# Patient Record
Sex: FEMALE | Race: WHITE | NOT HISPANIC OR LATINO | URBAN - METROPOLITAN AREA
[De-identification: names, ages, dates, MRNs, and addresses within clinical notes are randomized per-mention and may not be internally consistent; named-entity substitution may affect disease eponyms.]

---

## 2021-04-06 ENCOUNTER — INPATIENT (INPATIENT)
Facility: HOSPITAL | Age: 76
LOS: 2 days | Discharge: HOME HEALTH | DRG: 659 | End: 2021-04-09
Attending: STUDENT IN AN ORGANIZED HEALTH CARE EDUCATION/TRAINING PROGRAM
Payer: MEDICARE

## 2021-04-06 VITALS
WEIGHT: 119.93 LBS | DIASTOLIC BLOOD PRESSURE: 84 MMHG | HEART RATE: 104 BPM | OXYGEN SATURATION: 94 % | SYSTOLIC BLOOD PRESSURE: 134 MMHG | TEMPERATURE: 99 F | RESPIRATION RATE: 26 BRPM

## 2021-04-06 DIAGNOSIS — E03.9 HYPOTHYROIDISM, UNSPECIFIED: ICD-10-CM

## 2021-04-06 DIAGNOSIS — K59.00 CONSTIPATION, UNSPECIFIED: ICD-10-CM

## 2021-04-06 DIAGNOSIS — S72.009A FRACTURE OF UNSPECIFIED PART OF NECK OF UNSPECIFIED FEMUR, INITIAL ENCOUNTER FOR CLOSED FRACTURE: ICD-10-CM

## 2021-04-06 DIAGNOSIS — R63.8 OTHER SYMPTOMS AND SIGNS CONCERNING FOOD AND FLUID INTAKE: ICD-10-CM

## 2021-04-06 DIAGNOSIS — F41.9 ANXIETY DISORDER, UNSPECIFIED: ICD-10-CM

## 2021-04-06 DIAGNOSIS — I26.99 OTHER PULMONARY EMBOLISM WITHOUT ACUTE COR PULMONALE: ICD-10-CM

## 2021-04-06 DIAGNOSIS — J96.90 RESPIRATORY FAILURE, UNSPECIFIED, UNSPECIFIED WHETHER WITH HYPOXIA OR HYPERCAPNIA: ICD-10-CM

## 2021-04-06 DIAGNOSIS — A41.9 SEPSIS, UNSPECIFIED ORGANISM: ICD-10-CM

## 2021-04-06 DIAGNOSIS — G62.9 POLYNEUROPATHY, UNSPECIFIED: ICD-10-CM

## 2021-04-06 DIAGNOSIS — E83.52 HYPERCALCEMIA: ICD-10-CM

## 2021-04-06 LAB
ALBUMIN SERPL ELPH-MCNC: 3.6 G/DL — SIGNIFICANT CHANGE UP (ref 3.3–5)
ALBUMIN SERPL ELPH-MCNC: 4.1 G/DL — SIGNIFICANT CHANGE UP (ref 3.3–5)
ALP SERPL-CCNC: 49 U/L — SIGNIFICANT CHANGE UP (ref 40–120)
ALP SERPL-CCNC: 60 U/L — SIGNIFICANT CHANGE UP (ref 40–120)
ALT FLD-CCNC: 12 U/L — SIGNIFICANT CHANGE UP (ref 10–45)
ALT FLD-CCNC: 15 U/L — SIGNIFICANT CHANGE UP (ref 10–45)
ANION GAP SERPL CALC-SCNC: 10 MMOL/L — SIGNIFICANT CHANGE UP (ref 5–17)
ANION GAP SERPL CALC-SCNC: 12 MMOL/L — SIGNIFICANT CHANGE UP (ref 5–17)
APPEARANCE UR: CLEAR — SIGNIFICANT CHANGE UP
APTT BLD: 38 SEC — HIGH (ref 27.5–35.5)
AST SERPL-CCNC: 14 U/L — SIGNIFICANT CHANGE UP (ref 10–40)
AST SERPL-CCNC: 17 U/L — SIGNIFICANT CHANGE UP (ref 10–40)
BACTERIA # UR AUTO: ABNORMAL /HPF
BASE EXCESS BLDV CALC-SCNC: 4 MMOL/L — SIGNIFICANT CHANGE UP
BASOPHILS # BLD AUTO: 0.03 K/UL — SIGNIFICANT CHANGE UP (ref 0–0.2)
BASOPHILS # BLD AUTO: 0.06 K/UL — SIGNIFICANT CHANGE UP (ref 0–0.2)
BASOPHILS NFR BLD AUTO: 0.3 % — SIGNIFICANT CHANGE UP (ref 0–2)
BASOPHILS NFR BLD AUTO: 0.4 % — SIGNIFICANT CHANGE UP (ref 0–2)
BILIRUB SERPL-MCNC: 0.8 MG/DL — SIGNIFICANT CHANGE UP (ref 0.2–1.2)
BILIRUB SERPL-MCNC: 1.1 MG/DL — SIGNIFICANT CHANGE UP (ref 0.2–1.2)
BILIRUB UR-MCNC: ABNORMAL
BUN SERPL-MCNC: 11 MG/DL — SIGNIFICANT CHANGE UP (ref 7–23)
BUN SERPL-MCNC: 11 MG/DL — SIGNIFICANT CHANGE UP (ref 7–23)
CA-I SERPL-SCNC: 1.38 MMOL/L — HIGH (ref 1.12–1.3)
CALCIUM SERPL-MCNC: 10.4 MG/DL — SIGNIFICANT CHANGE UP (ref 8.4–10.5)
CALCIUM SERPL-MCNC: 11.2 MG/DL — HIGH (ref 8.4–10.5)
CHLORIDE SERPL-SCNC: 104 MMOL/L — SIGNIFICANT CHANGE UP (ref 96–108)
CHLORIDE SERPL-SCNC: 96 MMOL/L — SIGNIFICANT CHANGE UP (ref 96–108)
CO2 SERPL-SCNC: 24 MMOL/L — SIGNIFICANT CHANGE UP (ref 22–31)
CO2 SERPL-SCNC: 27 MMOL/L — SIGNIFICANT CHANGE UP (ref 22–31)
COLOR SPEC: YELLOW — SIGNIFICANT CHANGE UP
COMMENT - URINE: SIGNIFICANT CHANGE UP
CREAT SERPL-MCNC: 0.4 MG/DL — LOW (ref 0.5–1.3)
CREAT SERPL-MCNC: 0.47 MG/DL — LOW (ref 0.5–1.3)
CRP SERPL-MCNC: 69.6 MG/L — HIGH (ref 0–4)
D DIMER BLD IA.RAPID-MCNC: 4007 NG/ML DDU — HIGH
DIFF PNL FLD: ABNORMAL
EOSINOPHIL # BLD AUTO: 0 K/UL — SIGNIFICANT CHANGE UP (ref 0–0.5)
EOSINOPHIL # BLD AUTO: 0.23 K/UL — SIGNIFICANT CHANGE UP (ref 0–0.5)
EOSINOPHIL NFR BLD AUTO: 0 % — SIGNIFICANT CHANGE UP (ref 0–6)
EOSINOPHIL NFR BLD AUTO: 1.5 % — SIGNIFICANT CHANGE UP (ref 0–6)
EPI CELLS # UR: SIGNIFICANT CHANGE UP /HPF (ref 0–5)
FERRITIN SERPL-MCNC: 428 NG/ML — HIGH (ref 15–150)
GAS PNL BLDV: 136 MMOL/L — LOW (ref 138–146)
GAS PNL BLDV: SIGNIFICANT CHANGE UP
GAS PNL BLDV: SIGNIFICANT CHANGE UP
GLUCOSE SERPL-MCNC: 187 MG/DL — HIGH (ref 70–99)
GLUCOSE SERPL-MCNC: 209 MG/DL — HIGH (ref 70–99)
GLUCOSE UR QL: NEGATIVE — SIGNIFICANT CHANGE UP
HCO3 BLDV-SCNC: 28 MMOL/L — HIGH (ref 20–27)
HCT VFR BLD CALC: 35.8 % — SIGNIFICANT CHANGE UP (ref 34.5–45)
HCT VFR BLD CALC: 39.8 % — SIGNIFICANT CHANGE UP (ref 34.5–45)
HGB BLD-MCNC: 11.6 G/DL — SIGNIFICANT CHANGE UP (ref 11.5–15.5)
HGB BLD-MCNC: 13.3 G/DL — SIGNIFICANT CHANGE UP (ref 11.5–15.5)
IMM GRANULOCYTES NFR BLD AUTO: 0.6 % — SIGNIFICANT CHANGE UP (ref 0–1.5)
IMM GRANULOCYTES NFR BLD AUTO: 1.7 % — HIGH (ref 0–1.5)
INR BLD: 1.34 — HIGH (ref 0.88–1.16)
KETONES UR-MCNC: NEGATIVE — SIGNIFICANT CHANGE UP
LACTATE SERPL-SCNC: 2.7 MMOL/L — HIGH (ref 0.5–2)
LACTATE SERPL-SCNC: 2.8 MMOL/L — HIGH (ref 0.5–2)
LACTATE SERPL-SCNC: 3 MMOL/L — HIGH (ref 0.5–2)
LACTATE SERPL-SCNC: 3.4 MMOL/L — HIGH (ref 0.5–2)
LEUKOCYTE ESTERASE UR-ACNC: ABNORMAL
LYMPHOCYTES # BLD AUTO: 0.68 K/UL — LOW (ref 1–3.3)
LYMPHOCYTES # BLD AUTO: 1.99 K/UL — SIGNIFICANT CHANGE UP (ref 1–3.3)
LYMPHOCYTES # BLD AUTO: 13 % — SIGNIFICANT CHANGE UP (ref 13–44)
LYMPHOCYTES # BLD AUTO: 5.7 % — LOW (ref 13–44)
MCHC RBC-ENTMCNC: 29.5 PG — SIGNIFICANT CHANGE UP (ref 27–34)
MCHC RBC-ENTMCNC: 30 PG — SIGNIFICANT CHANGE UP (ref 27–34)
MCHC RBC-ENTMCNC: 32.4 GM/DL — SIGNIFICANT CHANGE UP (ref 32–36)
MCHC RBC-ENTMCNC: 33.4 GM/DL — SIGNIFICANT CHANGE UP (ref 32–36)
MCV RBC AUTO: 89.8 FL — SIGNIFICANT CHANGE UP (ref 80–100)
MCV RBC AUTO: 91.1 FL — SIGNIFICANT CHANGE UP (ref 80–100)
MONOCYTES # BLD AUTO: 0.18 K/UL — SIGNIFICANT CHANGE UP (ref 0–0.9)
MONOCYTES # BLD AUTO: 1.35 K/UL — HIGH (ref 0–0.9)
MONOCYTES NFR BLD AUTO: 1.5 % — LOW (ref 2–14)
MONOCYTES NFR BLD AUTO: 8.8 % — SIGNIFICANT CHANGE UP (ref 2–14)
NEUTROPHILS # BLD AUTO: 10.89 K/UL — HIGH (ref 1.8–7.4)
NEUTROPHILS # BLD AUTO: 11.46 K/UL — HIGH (ref 1.8–7.4)
NEUTROPHILS NFR BLD AUTO: 74.6 % — SIGNIFICANT CHANGE UP (ref 43–77)
NEUTROPHILS NFR BLD AUTO: 91.9 % — HIGH (ref 43–77)
NITRITE UR-MCNC: POSITIVE
NRBC # BLD: 0 /100 WBCS — SIGNIFICANT CHANGE UP (ref 0–0)
NRBC # BLD: 0 /100 WBCS — SIGNIFICANT CHANGE UP (ref 0–0)
PCO2 BLDV: 42 MMHG — SIGNIFICANT CHANGE UP (ref 39–42)
PH BLDV: 7.45 — HIGH (ref 7.32–7.43)
PH UR: 7.5 — SIGNIFICANT CHANGE UP (ref 5–8)
PLATELET # BLD AUTO: 196 K/UL — SIGNIFICANT CHANGE UP (ref 150–400)
PLATELET # BLD AUTO: 226 K/UL — SIGNIFICANT CHANGE UP (ref 150–400)
PO2 BLDV: 48 MMHG — SIGNIFICANT CHANGE UP
POTASSIUM BLDV-SCNC: 4.1 MMOL/L — SIGNIFICANT CHANGE UP (ref 3.5–4.9)
POTASSIUM SERPL-MCNC: 4 MMOL/L — SIGNIFICANT CHANGE UP (ref 3.5–5.3)
POTASSIUM SERPL-MCNC: 4.4 MMOL/L — SIGNIFICANT CHANGE UP (ref 3.5–5.3)
POTASSIUM SERPL-SCNC: 4 MMOL/L — SIGNIFICANT CHANGE UP (ref 3.5–5.3)
POTASSIUM SERPL-SCNC: 4.4 MMOL/L — SIGNIFICANT CHANGE UP (ref 3.5–5.3)
PROCALCITONIN SERPL-MCNC: 0.39 NG/ML — HIGH (ref 0.02–0.1)
PROT SERPL-MCNC: 6.7 G/DL — SIGNIFICANT CHANGE UP (ref 6–8.3)
PROT SERPL-MCNC: 7.4 G/DL — SIGNIFICANT CHANGE UP (ref 6–8.3)
PROT UR-MCNC: 100 MG/DL
PROTHROM AB SERPL-ACNC: 15.9 SEC — HIGH (ref 10.6–13.6)
RBC # BLD: 3.93 M/UL — SIGNIFICANT CHANGE UP (ref 3.8–5.2)
RBC # BLD: 4.43 M/UL — SIGNIFICANT CHANGE UP (ref 3.8–5.2)
RBC # FLD: 13.3 % — SIGNIFICANT CHANGE UP (ref 10.3–14.5)
RBC # FLD: 13.5 % — SIGNIFICANT CHANGE UP (ref 10.3–14.5)
RBC CASTS # UR COMP ASSIST: < 5 /HPF — SIGNIFICANT CHANGE UP
SAO2 % BLDV: 85 % — SIGNIFICANT CHANGE UP
SARS-COV-2 RNA SPEC QL NAA+PROBE: SIGNIFICANT CHANGE UP
SARS-COV-2 RNA SPEC QL NAA+PROBE: SIGNIFICANT CHANGE UP
SODIUM SERPL-SCNC: 135 MMOL/L — SIGNIFICANT CHANGE UP (ref 135–145)
SODIUM SERPL-SCNC: 138 MMOL/L — SIGNIFICANT CHANGE UP (ref 135–145)
SP GR SPEC: 1.01 — SIGNIFICANT CHANGE UP (ref 1–1.03)
TSH SERPL-MCNC: 0.38 UIU/ML — SIGNIFICANT CHANGE UP (ref 0.35–4.94)
UROBILINOGEN FLD QL: 1 E.U./DL — SIGNIFICANT CHANGE UP
WBC # BLD: 11.85 K/UL — HIGH (ref 3.8–10.5)
WBC # BLD: 15.35 K/UL — HIGH (ref 3.8–10.5)
WBC # FLD AUTO: 11.85 K/UL — HIGH (ref 3.8–10.5)
WBC # FLD AUTO: 15.35 K/UL — HIGH (ref 3.8–10.5)
WBC UR QL: ABNORMAL /HPF

## 2021-04-06 PROCEDURE — 99221 1ST HOSP IP/OBS SF/LOW 40: CPT

## 2021-04-06 PROCEDURE — 71045 X-RAY EXAM CHEST 1 VIEW: CPT | Mod: 26

## 2021-04-06 PROCEDURE — 71275 CT ANGIOGRAPHY CHEST: CPT | Mod: 26,ME

## 2021-04-06 PROCEDURE — G1004: CPT

## 2021-04-06 PROCEDURE — 99223 1ST HOSP IP/OBS HIGH 75: CPT | Mod: GC

## 2021-04-06 PROCEDURE — 93970 EXTREMITY STUDY: CPT | Mod: 26

## 2021-04-06 PROCEDURE — 99285 EMERGENCY DEPT VISIT HI MDM: CPT | Mod: CS

## 2021-04-06 RX ORDER — APIXABAN 2.5 MG/1
5 TABLET, FILM COATED ORAL EVERY 12 HOURS
Refills: 0 | Status: DISCONTINUED | OUTPATIENT
Start: 2021-04-06 | End: 2021-04-07

## 2021-04-06 RX ORDER — POLYETHYLENE GLYCOL 3350 17 G/17G
17 POWDER, FOR SOLUTION ORAL
Refills: 0 | Status: DISCONTINUED | OUTPATIENT
Start: 2021-04-06 | End: 2021-04-08

## 2021-04-06 RX ORDER — LINACLOTIDE 145 UG/1
1 CAPSULE, GELATIN COATED ORAL
Qty: 0 | Refills: 0 | DISCHARGE

## 2021-04-06 RX ORDER — PIPERACILLIN AND TAZOBACTAM 4; .5 G/20ML; G/20ML
3.38 INJECTION, POWDER, LYOPHILIZED, FOR SOLUTION INTRAVENOUS EVERY 6 HOURS
Refills: 0 | Status: DISCONTINUED | OUTPATIENT
Start: 2021-04-06 | End: 2021-04-07

## 2021-04-06 RX ORDER — MIRTAZAPINE 45 MG/1
1 TABLET, ORALLY DISINTEGRATING ORAL
Qty: 0 | Refills: 0 | DISCHARGE

## 2021-04-06 RX ORDER — IPRATROPIUM/ALBUTEROL SULFATE 18-103MCG
3 AEROSOL WITH ADAPTER (GRAM) INHALATION ONCE
Refills: 0 | Status: COMPLETED | OUTPATIENT
Start: 2021-04-06 | End: 2021-04-06

## 2021-04-06 RX ORDER — LACTULOSE 10 G/15ML
20 SOLUTION ORAL ONCE
Refills: 0 | Status: COMPLETED | OUTPATIENT
Start: 2021-04-06 | End: 2021-04-06

## 2021-04-06 RX ORDER — DIPHENHYDRAMINE HCL 50 MG
1 CAPSULE ORAL
Qty: 0 | Refills: 0 | DISCHARGE

## 2021-04-06 RX ORDER — MIRTAZAPINE 45 MG/1
1 TABLET, ORALLY DISINTEGRATING ORAL
Qty: 0 | Refills: 0 | DISCHARGE
End: 2020-12-31

## 2021-04-06 RX ORDER — SODIUM CHLORIDE 9 MG/ML
1000 INJECTION INTRAMUSCULAR; INTRAVENOUS; SUBCUTANEOUS ONCE
Refills: 0 | Status: COMPLETED | OUTPATIENT
Start: 2021-04-06 | End: 2021-04-06

## 2021-04-06 RX ORDER — PANTOPRAZOLE SODIUM 20 MG/1
1 TABLET, DELAYED RELEASE ORAL
Qty: 0 | Refills: 0 | DISCHARGE

## 2021-04-06 RX ORDER — SENNA PLUS 8.6 MG/1
2 TABLET ORAL AT BEDTIME
Refills: 0 | Status: DISCONTINUED | OUTPATIENT
Start: 2021-04-06 | End: 2021-04-08

## 2021-04-06 RX ORDER — ACETAMINOPHEN 500 MG
1000 TABLET ORAL ONCE
Refills: 0 | Status: COMPLETED | OUTPATIENT
Start: 2021-04-06 | End: 2021-04-06

## 2021-04-06 RX ORDER — SODIUM CHLORIDE 9 MG/ML
1000 INJECTION INTRAMUSCULAR; INTRAVENOUS; SUBCUTANEOUS ONCE
Refills: 0 | Status: DISCONTINUED | OUTPATIENT
Start: 2021-04-06 | End: 2021-04-06

## 2021-04-06 RX ORDER — SODIUM CHLORIDE 9 MG/ML
500 INJECTION INTRAMUSCULAR; INTRAVENOUS; SUBCUTANEOUS ONCE
Refills: 0 | Status: COMPLETED | OUTPATIENT
Start: 2021-04-06 | End: 2021-04-06

## 2021-04-06 RX ORDER — PANTOPRAZOLE SODIUM 20 MG/1
40 TABLET, DELAYED RELEASE ORAL
Refills: 0 | Status: DISCONTINUED | OUTPATIENT
Start: 2021-04-06 | End: 2021-04-08

## 2021-04-06 RX ORDER — LORATADINE 10 MG/1
2 TABLET ORAL
Qty: 0 | Refills: 0 | DISCHARGE

## 2021-04-06 RX ORDER — DIPHENHYDRAMINE HCL 50 MG
50 CAPSULE ORAL ONCE
Refills: 0 | Status: COMPLETED | OUTPATIENT
Start: 2021-04-06 | End: 2021-04-06

## 2021-04-06 RX ORDER — METOCLOPRAMIDE HCL 10 MG
1 TABLET ORAL
Qty: 0 | Refills: 0 | DISCHARGE

## 2021-04-06 RX ORDER — LEVOTHYROXINE SODIUM 125 MCG
50 TABLET ORAL DAILY
Refills: 0 | Status: DISCONTINUED | OUTPATIENT
Start: 2021-04-06 | End: 2021-04-08

## 2021-04-06 RX ORDER — LEVOTHYROXINE SODIUM 125 MCG
1 TABLET ORAL
Qty: 0 | Refills: 0 | DISCHARGE

## 2021-04-06 RX ORDER — DIAZEPAM 5 MG
1 TABLET ORAL
Qty: 0 | Refills: 0 | DISCHARGE
End: 2020-12-31

## 2021-04-06 RX ORDER — PIPERACILLIN AND TAZOBACTAM 4; .5 G/20ML; G/20ML
3.38 INJECTION, POWDER, LYOPHILIZED, FOR SOLUTION INTRAVENOUS ONCE
Refills: 0 | Status: COMPLETED | OUTPATIENT
Start: 2021-04-06 | End: 2021-04-06

## 2021-04-06 RX ORDER — DIAZEPAM 5 MG
1 TABLET ORAL
Qty: 0 | Refills: 0 | DISCHARGE

## 2021-04-06 RX ORDER — APIXABAN 2.5 MG/1
1 TABLET, FILM COATED ORAL
Qty: 0 | Refills: 0 | DISCHARGE

## 2021-04-06 RX ADMIN — SODIUM CHLORIDE 1000 MILLILITER(S): 9 INJECTION INTRAMUSCULAR; INTRAVENOUS; SUBCUTANEOUS at 13:58

## 2021-04-06 RX ADMIN — APIXABAN 5 MILLIGRAM(S): 2.5 TABLET, FILM COATED ORAL at 14:35

## 2021-04-06 RX ADMIN — PANTOPRAZOLE SODIUM 40 MILLIGRAM(S): 20 TABLET, DELAYED RELEASE ORAL at 14:35

## 2021-04-06 RX ADMIN — APIXABAN 5 MILLIGRAM(S): 2.5 TABLET, FILM COATED ORAL at 21:26

## 2021-04-06 RX ADMIN — SODIUM CHLORIDE 1000 MILLILITER(S): 9 INJECTION INTRAMUSCULAR; INTRAVENOUS; SUBCUTANEOUS at 16:21

## 2021-04-06 RX ADMIN — Medication 50 MICROGRAM(S): at 14:35

## 2021-04-06 RX ADMIN — SODIUM CHLORIDE 1000 MILLILITER(S): 9 INJECTION INTRAMUSCULAR; INTRAVENOUS; SUBCUTANEOUS at 08:00

## 2021-04-06 RX ADMIN — Medication 125 MILLIGRAM(S): at 04:47

## 2021-04-06 RX ADMIN — Medication 3 MILLILITER(S): at 03:26

## 2021-04-06 RX ADMIN — Medication 400 MILLIGRAM(S): at 03:18

## 2021-04-06 RX ADMIN — PIPERACILLIN AND TAZOBACTAM 200 GRAM(S): 4; .5 INJECTION, POWDER, LYOPHILIZED, FOR SOLUTION INTRAVENOUS at 13:58

## 2021-04-06 RX ADMIN — POLYETHYLENE GLYCOL 3350 17 GRAM(S): 17 POWDER, FOR SOLUTION ORAL at 21:26

## 2021-04-06 RX ADMIN — SODIUM CHLORIDE 1000 MILLILITER(S): 9 INJECTION INTRAMUSCULAR; INTRAVENOUS; SUBCUTANEOUS at 03:26

## 2021-04-06 RX ADMIN — Medication 10 MILLIGRAM(S): at 14:35

## 2021-04-06 RX ADMIN — PIPERACILLIN AND TAZOBACTAM 200 GRAM(S): 4; .5 INJECTION, POWDER, LYOPHILIZED, FOR SOLUTION INTRAVENOUS at 18:19

## 2021-04-06 RX ADMIN — Medication 1 DROP(S): at 21:26

## 2021-04-06 RX ADMIN — Medication 50 MILLIGRAM(S): at 07:38

## 2021-04-06 RX ADMIN — SENNA PLUS 2 TABLET(S): 8.6 TABLET ORAL at 21:26

## 2021-04-06 RX ADMIN — PIPERACILLIN AND TAZOBACTAM 200 GRAM(S): 4; .5 INJECTION, POWDER, LYOPHILIZED, FOR SOLUTION INTRAVENOUS at 03:19

## 2021-04-06 NOTE — CONSULT NOTE ADULT - ASSESSMENT
76y old f pmhx of chronic inflammatory demyelinating syndrome, chronic utis w/ suprapubic catheter, neurogenic bladder, dropped foot, broken hip fracture in 2016 not repaired, Feb 2010 shoulder surgery, and rotator cuff tear w/ 24 hour healthcare due to injuries here for severe sepsis    #Neuro  -toxic metabolic encephalopathy  avoid home valium can continue mirtazapine once patients mental function improves  likely 2/2 uti manage as below    #Cardiac  CHRISTIAN  check EKG    #Pulmonary  -hypoxia requiring 2 L NC  d-dimer elevated f/u CT PE protocol  pt is on eliquis if positive for PE heparin drip    #GI  -constipation  miralax QD  c/w reglan 10 mg TID  NPO now until mental status improves  -GERD  protonix daily    #Renal  CHRISTIAN    #ID  -severe sepsis secondary to uti  continue with zosyn f/u urine and blood cultures  s/p 1 L NS  f/u COVID swab    #Heme  CHRISTIAN    F-s/p 1 L NS  E-replete K<4 and Mag <2  N-NPO for now  FULL code  dispo______ 76y old f pmhx of chronic inflammatory demyelinating syndrome, chronic utis w/ suprapubic catheter, neurogenic bladder, dropped foot, broken hip fracture in 2016 not repaired, Feb 2010 shoulder surgery, and rotator cuff tear w/ 24 hour healthcare due to injuries here for severe sepsis    #Neuro  -toxic metabolic encephalopathy  avoid home valium can continue mirtazapine once patients mental function improves  likely 2/2 uti manage as below  check ammonia level   -anxiety disorder  hold valium and mirtazapine for now    #Cardiac  CHRISTIAN  check EKG for Qtc  -give another 500 cc LR     #Pulmonary  -hypoxic respiratory failure requiring 2 L NC  d-dimer elevated f/u CT PE protocol this will evaluate for pneumonia as well  nasal cannula for saturation >94%  pt is on eliquis if positive for PE heparin drip    #GI  -constipation  NPO now until mental status improves, bedside dysphagia screen  miralax QD  c/w reglan 10 mg TID  -GERD  protonix daily    #Renal  CHRISTIAN  switch suprapubic catheter 48-72hrs after treatment    #ID  -severe sepsis secondary to uti  continue with zosyn f/u urine and blood cultures  f/u ct scan to rule out aspiration   s/p 1 L NS  f/u COVID swab  f/u lactic acid  switch suprapubic catheter 48-72hrs after treatment    #Endocrine  -hypothyroidism  continue with synthroid can do 37.5 mg iv     #Heme  CHRISTIAN    F-s/p 1 L NS  E-replete K<4 and Mag <2  N-NPO for now  FULL code  dispo______ 76y old f pmhx of chronic inflammatory demyelinating syndrome, chronic utis w/ suprapubic catheter, neurogenic bladder, dropped foot, broken hip fracture in 2016 not repaired, Feb 2010 shoulder surgery, and rotator cuff tear w/ 24 hour healthcare due to injuries here for severe sepsis    #Neuro  -toxic metabolic encephalopathy  avoid home valium can continue mirtazapine once patients mental function improves  likely 2/2 uti manage as below  check ammonia level   -anxiety disorder  hold valium and mirtazapine for now    #Cardiac  CHRISTIAN  check EKG for Qtc  -give another 500 cc LR     #Pulmonary  -hypoxic respiratory failure requiring 2 L NC  d-dimer elevated f/u CT PE protocol this will evaluate for pneumonia as well  nasal cannula for saturation >94%  pt is on eliquis if positive for PE heparin drip    #GI  -constipation  NPO now until mental status improves, bedside dysphagia screen  miralax QD  c/w reglan 10 mg TID  -GERD  protonix daily    #Renal  CHRISTIAN  switch suprapubic catheter 48-72hrs after treatment    #ID  -severe sepsis secondary to uti  continue with zosyn f/u urine and blood cultures  f/u ct scan to rule out aspiration   s/p 1 L NS  f/u COVID swab  f/u lactic acid  switch suprapubic catheter 48-72hrs after treatment    #Endocrine  -hypothyroidism  continue with synthroid can do 37.5 mg iv     #Heme  CHRISTIAN    F-s/p 1 L NS  E-replete K<4 and Mag <2  N-NPO for now  FULL code  dispo RMF 76y old f pmhx of chronic inflammatory demyelinating syndrome, chronic utis w/ suprapubic catheter, neurogenic bladder, dropped foot, broken hip fracture in 2016 not repaired, Feb 2010 shoulder surgery, and rotator cuff tear w/ 24 hour healthcare due to injuries here for severe sepsis    #Neuro  -toxic metabolic encephalopathy  avoid home valium can continue mirtazapine once patients mental function improves  likely 2/2 uti manage as below  check ammonia level   -anxiety disorder  hold valium and mirtazapine for now    #Cardiac  CHRISTIAN  check EKG for Qtc  -give another 500 cc LR     #Pulmonary  -hypoxic respiratory failure requiring 2 L NC  d-dimer elevated f/u CT PE protocol this will evaluate for pneumonia as well  nasal cannula for saturation >94%  pt is on eliquis if positive for PE heparin drip    #GI  -constipation  NPO now until mental status improves, bedside dysphagia screen  miralax QD  c/w reglan 10 mg TID  -GERD  protonix daily    #Renal  CHRISTIAN  switch suprapubic catheter 48-72hrs after treatment    #ID  -severe sepsis secondary to uti  continue with zosyn f/u urine and blood cultures  f/u ct scan to rule out aspiration   s/p 1 L NS  f/u COVID swab  f/u lactic acid  switch suprapubic catheter 48-72hrs after treatment    #Endocrine  -hypothyroidism  continue with synthroid can do 37.5 mg iv     #Heme  CHRISTIAN    F-s/p 1 L NS  E-replete K<4 and Mag <2  N-NPO for now  FULL code  dispo RMF  reconsult as needed

## 2021-04-06 NOTE — ED PROVIDER NOTE - CLINICAL SUMMARY MEDICAL DECISION MAKING FREE TEXT BOX
76 F bedbound w/ 24 hr HHA, a/ox3 at baseline as per aid, pmh PE on eliquis, hypothyroid, chronic inflamm demyelinating polyneuropathy, chronic suprapubic catheter, hip fracture opted to forgo surgery bibems w/ HHA for lethargy, cough, and fever x 3 days.   on exam pt febrile, tachy, + hypoxia 90% RA, 94% on 3L NC, no dyspnea, oriented to self/place, lungs ctab, +s1/2, abd w/ mild suprapubic ttp, no rebound/guarding, no LE edema, GARCIA, no focal deficits.  likely urosepsis, PNA, ?covid but fully vaccinated, consider PE.  sepsis protocol initiated, labs/cultures, IVF, abx, tylenol, continue supplemental O2.

## 2021-04-06 NOTE — H&P ADULT - PROBLEM SELECTOR PLAN 2
Patient with SpO2 90% on RA on admission. Increased to 94% on 3L NC. Suspicion for PE on admission given PMHx and elevated d-dimer of 4007. CT PE negative for PE. Patient is anticoagulated on Eliquis bid at home.   - CT PE negative for PE with pulmonary edema and venous congestion  - No hx of CHF

## 2021-04-06 NOTE — ED ADULT NURSE NOTE - NSIMPLEMENTINTERV_GEN_ALL_ED
Implemented All Fall Risk Interventions:  [place] to call system. Call bell, personal items and telephone within reach. Instruct patient to call for assistance. Room bathroom lighting operational. Non-slip footwear when patient is off stretcher. Physically safe environment: no spills, clutter or unnecessary equipment. Stretcher in lowest position, wheels locked, appropriate side rails in place. Provide visual cue, wrist band, yellow gown, etc. Monitor gait and stability. Monitor for mental status changes and reorient to person, place, and time. Review medications for side effects contributing to fall risk. Reinforce activity limits and safety measures with patient and family.

## 2021-04-06 NOTE — ED ADULT TRIAGE NOTE - CHIEF COMPLAINT QUOTE
Per EMS, HHA reported fever, dark colored urine and AMS since yesterday. Baseline AOx3, upon arrival AOx1. Indwelling urinary catheter noted. . Per EMS, HHA reported fever, dark colored urine and AMS since yesterday. 3L NC applied by EMS for SpO2 90% RA. Baseline AOx3, upon arrival AOx1. Indwelling urinary catheter noted. .

## 2021-04-06 NOTE — ED PROVIDER NOTE - ATTENDING CONTRIBUTION TO CARE
bedbound, 24 hr home health aide, prior demyelinated polyneuropathy , ho of hypothyroid, PE on eliquis,   here w home health aide,   new cough, fever , lethargy for 4 days, ho of COVID vaccine x 2 ( 2/26 last dose), cough non productive  ,  as per aide confused from baseline , sleepy and no SOB apparent, 100 temp yesterday,   in dwelling suprapupic cath,   consider aspiration pna, consider uti, consider COVID despite vaccine but less likely ,   dispo pending workup and reeval. bedbound, 24 hr home health aide, prior demyelinated polyneuropathy , ho of hypothyroid, PE on eliquis,   here w home health aide,   new cough, fever , lethargy for 4 days, ho of COVID vaccine x 2 ( 2/26 last dose), cough non productive  ,  as per aide confused from baseline , sleepy and no SOB apparent, 100 temp yesterday,   in dwelling suprapupic cath,   consider aspiration pna, consider uti, consider COVID,  despite vaccine but less likely , consider PE   dispo pending workup and reeval.    ( bedbound, 24 hr home health aide, prior demyelinated polyneuropathy , ho of hypothyroid, PE on eliquis,   here w home health aide,   new cough, fever , lethargy for 4 days, ho of COVID vaccine x 2 ( 2/26 last dose), cough non productive  ,  as per aide confused from baseline , sleepy and no SOB apparent, 100 temp yesterday,   in dwelling suprapupic cath,   consider aspiration pna, consider uti, consider COVID,  despite vaccine but less likely , consider PE   dispo pending workup and reeval.    + UTI , elevated d dimer , high suspician for PE , will CTA after premedication, ICU consulted

## 2021-04-06 NOTE — CONSULT NOTE ADULT - SUBJECTIVE AND OBJECTIVE BOX
Consultation Requested by: Dr. Tirado    Patient is a 76y old f pmhx of chronic inflammatory demyelinating syndrome, chronic utis w/ suprapubic catheter, neurogenic bladder, dropped foot, broken hip fracture in 2016 not repaired, 2010 shoulder surgery, and rotator cuff tear w/ 24 hour healthcare due to injuries. Aid noted patient is more confused than at baseline. She is a/o times 2 and sleeping more. In the ED rectal temp was 102, lactate 3, and u/a positive. She received zosyn. Had one uti this year several months ago. Denies any sputum production. D-dimer 3k, CT PE ordered.    Vitals Tm 102 F, /67, , RR 20 94% on 2 L NC  s/p 1 L NS    Home meds: synthroid 50 micrograms, claritin 2 per day for allergies or benadryl if severe, reglan 10 mg TID, eliquis 5 mg BID, valium 10 mg as needed, mirtazapine 15 mg at bedtime    Allergies  angiotensin converting enzyme inhibitors (Unknown)  IV Contrast (Unknown)  nitrofurantoin (Unknown)    Antimicrobials: zosyn    Other Medications:  diphenhydrAMINE   Injectable 50 milliGRAM(s) IV Push Once    FAMILY HISTORY:  PAST MEDICAL & SURGICAL HISTORY:  SOCIAL HISTORY:    IMMUNIZATIONS    PHYSICAL EXAM  PHYSICAL EXAM:  GENERAL: NAD, well-developed  HEAD:  Atraumatic, Normocephalic  EYES: EOMI, PERRLA, conjunctiva and sclera clear  NECK: Supple, No JVD  CHEST/LUNG: Clear to auscultation bilaterally; No wheeze  HEART: Regular rate and rhythm; No murmurs, rubs, or gallops  ABDOMEN: Soft, Nontender, Nondistended; Bowel sounds present  EXTREMITIES:  2+ Peripheral Pulses, No clubbing, cyanosis, or edema  PSYCH: AAOx1-2   NEUROLOGY: moves all extremities except LLE  : suprapubic catheter in place  SKIN: No rashes or lesions:    Lab Results:                        13.3   15.35 )-----------( 226      ( 2021 03:16 )             39.8     04-06    135  |  96  |  11  ----------------------------<  187<H>  4.0   |  27  |  0.47<L>    Ca    11.2<H>      2021 03:17    TPro  7.4  /  Alb  4.1  /  TBili  1.1  /  DBili  x   /  AST  17  /  ALT  15  /  AlkPhos  60  04-06    LIVER FUNCTIONS - ( 2021 03:17 )  Alb: 4.1 g/dL / Pro: 7.4 g/dL / ALK PHOS: 60 U/L / ALT: 15 U/L / AST: 17 U/L / GGT: x           PT/INR - ( 2021 03:14 )   PT: 15.9 sec;   INR: 1.34          PTT - ( 2021 03:14 )  PTT:38.0 sec  Urinalysis Basic - ( 2021 03:20 )    Color: Yellow / Appearance: Clear / S.015 / pH: x  Gluc: x / Ketone: NEGATIVE  / Bili: Small / Urobili: 1.0 E.U./dL   Blood: x / Protein: 100 mg/dL / Nitrite: POSITIVE   Leuk Esterase: Moderate / RBC: < 5 /HPF / WBC Many /HPF   Sq Epi: x / Non Sq Epi: 0-5 /HPF / Bacteria: Many /HPF   Emergency contact: Dr. Candace Tran [phone]  Consultation Requested by: Dr. Tirado    Patient is a 76y old f pmhx of chronic inflammatory demyelinating syndrome, chronic utis w/ suprapubic catheter, neurogenic bladder, dropped foot, broken hip fracture in 2016 not repaired, 2010 shoulder surgery, and rotator cuff tear w/ 24 hour healthcare due to injuries. Aid noted patient is more confused than at baseline. She is a/o times 2 and sleeping more. In the ED rectal temp was 102, lactate 3, and u/a positive. She received zosyn. Had one uti this year several months ago. Denies any sputum production. D-dimer 3k, CT PE ordered.    Vitals Tm 102 F, /67, , RR 20 94% on 2 L NC  s/p 1 L NS    Home meds: synthroid 50 micrograms, claritin 2 per day for allergies or benadryl if severe, reglan 10 mg TID, eliquis 5 mg BID, valium 10 mg as needed, mirtazapine 15 mg at bedtime    Allergies  angiotensin converting enzyme inhibitors (Unknown)  IV Contrast (Unknown)  nitrofurantoin (Unknown)    Antimicrobials: zosyn    Other Medications:  diphenhydrAMINE   Injectable 50 milliGRAM(s) IV Push Once    FAMILY HISTORY:  PAST MEDICAL & SURGICAL HISTORY:  SOCIAL HISTORY:    IMMUNIZATIONS    PHYSICAL EXAM  PHYSICAL EXAM:  GENERAL: NAD, well-developed  HEAD:  Atraumatic, Normocephalic  EYES: EOMI, PERRLA, conjunctiva and sclera clear  NECK: Supple, No JVD  CHEST/LUNG: Clear to auscultation bilaterally; No wheeze  HEART: Regular rate and rhythm; No murmurs, rubs, or gallops  ABDOMEN: Soft, Nontender, Nondistended; Bowel sounds present  EXTREMITIES:  2+ Peripheral Pulses, No clubbing, cyanosis, or edema  PSYCH: AAOx1-2   NEUROLOGY: moves all extremities except LLE  : suprapubic catheter in place  SKIN: No rashes or lesions:    Lab Results:                        13.3   15.35 )-----------( 226      ( 2021 03:16 )             39.8     04-06    135  |  96  |  11  ----------------------------<  187<H>  4.0   |  27  |  0.47<L>    Ca    11.2<H>      2021 03:17    TPro  7.4  /  Alb  4.1  /  TBili  1.1  /  DBili  x   /  AST  17  /  ALT  15  /  AlkPhos  60  04-06    LIVER FUNCTIONS - ( 2021 03:17 )  Alb: 4.1 g/dL / Pro: 7.4 g/dL / ALK PHOS: 60 U/L / ALT: 15 U/L / AST: 17 U/L / GGT: x           PT/INR - ( 2021 03:14 )   PT: 15.9 sec;   INR: 1.34          PTT - ( 2021 03:14 )  PTT:38.0 sec  Urinalysis Basic - ( 2021 03:20 )    Color: Yellow / Appearance: Clear / S.015 / pH: x  Gluc: x / Ketone: NEGATIVE  / Bili: Small / Urobili: 1.0 E.U./dL   Blood: x / Protein: 100 mg/dL / Nitrite: POSITIVE   Leuk Esterase: Moderate / RBC: < 5 /HPF / WBC Many /HPF   Sq Epi: x / Non Sq Epi: 0-5 /HPF / Bacteria: Many /HPF   Emergency contact: Dr. Candace Tran [phone]  Consultation Requested by: Dr. Tirado    Patient is a 76y old f pmhx of chronic inflammatory demyelinating syndrome, GERD, hypothyroidism, constipation, chronic utis w/ suprapubic catheter, neurogenic bladder, dropped foot, broken hip fracture in 2016 not repaired, 2010 shoulder surgery, and rotator cuff tear w/ 24 hour healthcare due to injuries. Aid noted patient is more confused than at baseline. She is a/o times 2 and sleeping more. In the ED rectal temp was 102, lactate 3, and u/a positive. She received zosyn. Had one uti this year several months ago. Denies any sputum production. D-dimer 3k, CT PE ordered.    Vitals Tm 102 F, /67, , RR 20 94% on 2 L NC  s/p 1 L NS    Home meds: synthroid 50 micrograms, claritin 2 per day for allergies or benadryl if severe, reglan 10 mg TID, eliquis 5 mg BID, valium 10 mg as needed, mirtazapine 15 mg at bedtime    Allergies  angiotensin converting enzyme inhibitors (Unknown)  IV Contrast (Unknown)  nitrofurantoin (Unknown)    Antimicrobials: zosyn    Other Medications:  diphenhydrAMINE   Injectable 50 milliGRAM(s) IV Push Once    FAMILY HISTORY:  PAST MEDICAL & SURGICAL HISTORY:  SOCIAL HISTORY:    IMMUNIZATIONS    PHYSICAL EXAM  PHYSICAL EXAM:  GENERAL: NAD, well-developed  HEAD:  Atraumatic, Normocephalic  EYES: EOMI, PERRLA, conjunctiva and sclera clear  NECK: Supple, No JVD  CHEST/LUNG: Clear to auscultation bilaterally; No wheeze  HEART: Regular rate and rhythm; No murmurs, rubs, or gallops  ABDOMEN: Soft, Nontender, Nondistended; Bowel sounds present  EXTREMITIES:  2+ Peripheral Pulses, No clubbing, cyanosis, or edema  PSYCH: AAOx1-2   NEUROLOGY: moves all extremities except LLE  : suprapubic catheter in place  SKIN: No rashes or lesions:    Lab Results:                        13.3   15.35 )-----------( 226      ( 2021 03:16 )             39.8     04-06    135  |  96  |  11  ----------------------------<  187<H>  4.0   |  27  |  0.47<L>    Ca    11.2<H>      2021 03:17    TPro  7.4  /  Alb  4.1  /  TBili  1.1  /  DBili  x   /  AST  17  /  ALT  15  /  AlkPhos  60  04-06    LIVER FUNCTIONS - ( 2021 03:17 )  Alb: 4.1 g/dL / Pro: 7.4 g/dL / ALK PHOS: 60 U/L / ALT: 15 U/L / AST: 17 U/L / GGT: x           PT/INR - ( 2021 03:14 )   PT: 15.9 sec;   INR: 1.34          PTT - ( 2021 03:14 )  PTT:38.0 sec  Urinalysis Basic - ( 2021 03:20 )    Color: Yellow / Appearance: Clear / S.015 / pH: x  Gluc: x / Ketone: NEGATIVE  / Bili: Small / Urobili: 1.0 E.U./dL   Blood: x / Protein: 100 mg/dL / Nitrite: POSITIVE   Leuk Esterase: Moderate / RBC: < 5 /HPF / WBC Many /HPF   Sq Epi: x / Non Sq Epi: 0-5 /HPF / Bacteria: Many /HPF

## 2021-04-06 NOTE — H&P ADULT - PROBLEM SELECTOR PLAN 1
Patient with 5 days of increasing confusion and fever. On admission, noted to have Tmax 102, , WBC 15, and RR 26 with positive UA and lactate 3.0. Patient with chronic suprapubic catheter in place, last changed last week, and hx of multiple UTIs. Likely severe sepsis 2/2 UTI.   - Suprapubic cath in place, not clogged, changed last week  - s/p Zosyn 3.375mg x1 in ED  - f/u culture  - c/w Zosyn 3.375 IV q8h  - Consult urology to change suprapubic cath in 72 hr

## 2021-04-06 NOTE — H&P ADULT - PROBLEM SELECTOR PLAN 3
Patient has not had BM in 4 days. Taking Linzess and miralax bid at home but struggles to pass BM d/t neurogenic issues.   - Started on bowel regimen: Miralax bid, senna 2 tab qhs, lactulose 20mL, dulcolax suppository, tap water enema  - f/u BM

## 2021-04-06 NOTE — H&P ADULT - PROBLEM SELECTOR PLAN 7
Patient with hx of hip fracture in 2016 but was not good candidate for surgery. Never had treated.   - Tylenol PRN for pain

## 2021-04-06 NOTE — ED ADULT NURSE NOTE - OBJECTIVE STATEMENT
pt. presents with c/o fever and AMS since yesterday, as per HHA. Pt. is awake, breathing spontaneous, pt. has mild tachycardia and tachypnea on arrival, O2 at 3L/min via nasal cannula applied, suprapubic catheter noted, urine in the bag cloudy, pt. has elevated T rectally, blood was collected. cardiac monitor in progress.

## 2021-04-06 NOTE — H&P ADULT - PROBLEM SELECTOR PLAN 9
Patient with hx of polyneuropathy. On suprapubic cath for neurogenic bladder.   - C/w suprapubic cath- changed 1/month, last changed last week

## 2021-04-06 NOTE — H&P ADULT - NSICDXPASTMEDICALHX_GEN_ALL_CORE_FT
PAST MEDICAL HISTORY:  Hip fracture     Hypothyroid     PE (pulmonary thromboembolism)     Polyneuropathy

## 2021-04-06 NOTE — CHART NOTE - NSCHARTNOTEFT_GEN_A_CORE
INFECTIOUS DISEASES BRIEF NOTE    Called to comment on COVID-19 isolation or not.     76F h/o PE, recurrent UTI w/ suprapubic cath, neurogenic bladder p/w fever, confusion and cough.  She is hypoxic to 90% with improvement with 2LNC. WBC 15, ferritin 400s, CRP 60s, Procal 0.55, di-dimer 4000s. COVID PCR neg x1.  UA positive.  CTPE showed negative PE, minimal interstitial pulmonary edema with pulmonary venous congestion. She likely has sepsis due to UTI but currently no good explanation for hypoxia.  No prior h/o CHF and BNP low, no consolidation. Team suspecting possible COVID although patient did get COVID vaccine x 2 in March.    - isolate patient to 2WO  - repeat COVID-19 PCR later   - COVID-19 ab (both spike and nucleocupsid)  - work-up for acute hypoxic respiratory failure, such as TTE  - f/u BCx, UCx      If you have any questions, please feel free to contact me.      Tawny Love MD, MS  Infectious Disease attending  work cell [phone]

## 2021-04-06 NOTE — H&P ADULT - PROBLEM SELECTOR PLAN 10
F: s/p 1L NS, will get 1 more L NS  E: Replete PRN  N: Regular   GI PPx: Protonix 40 qd  DVT PPx: Eliquis 5mg bid  Dispo: [facility]

## 2021-04-06 NOTE — ED PROVIDER NOTE - OBJECTIVE STATEMENT
most HPI obtained from A Nesha Hall [phone]  76 F bedbound w/ 24 hr HHA, a/ox3 at baseline as per aid, pmh PE on eliquis, hypothyroid, chronic inflamm demyelinating polyneuropathy, chronic suprapubic catheter, hip fracture opted to forgo surgery bibems w/ HHA for lethargy, cough, and fever x 3 days.  Pt states she is feeling well without any acute complaints, however HHA reports dry cough x 3 days- no SOB.  HHA states pt has been sleeping all day/night when usually awake and conversant all day at baseline, also more confused than normal.  + fever last 24 hours, gave tylenol yesterday afternoon.  Also reports catheter has been become clogged a lot recently, changed more often due to clogging.  Denies headache, dizziness, chest pain, sob, abd pain, nvd, hematuria, blood in stool/melena, sick contacts, trauma.  Pt fully vaccinated for covid as of 2/26 (moderna)

## 2021-04-06 NOTE — H&P ADULT - NSHPPHYSICALEXAM_GEN_ALL_CORE
.  VITAL SIGNS:  T(C): 36.8 (04-06-21 @ 10:00), Max: 38.9 (04-06-21 @ 02:20)  T(F): 98.2 (04-06-21 @ 10:00), Max: 102.1 (04-06-21 @ 02:20)  HR: 89 (04-06-21 @ 10:00) (89 - 105)  BP: 130/73 (04-06-21 @ 10:00) (112/60 - 134/84)  BP(mean): --  RR: 20 (04-06-21 @ 10:00) (20 - 26)  SpO2: 97% (04-06-21 @ 10:00) (94% - 97%)  Wt(kg): --    PHYSICAL EXAM:    Constitutional: WDWN resting comfortably in bed; NAD  Head: NC/AT  Eyes: PERRL, EOMI, anicteric sclera  ENT: no nasal discharge; uvula midline, no oropharyngeal erythema or exudates; MMM  Neck: supple; no JVD or thyromegaly  Respiratory: CTA B/L; no W/R/R, no retractions  Cardiac: +S1/S2; RRR; no M/R/G; PMI non-displaced  Gastrointestinal: abdomen soft, NT/ND; no rebound or guarding; +BSx4  Genitourinary: normal external genitalia  Back: spine midline, no bony tenderness or step-offs; no CVAT B/L  Extremities: WWP, no clubbing or cyanosis; no peripheral edema  Musculoskeletal: NROM x4; no joint swelling, tenderness or erythema  Vascular: 2+ radial, femoral, DP/PT pulses B/L  Dermatologic: skin warm, dry and intact; no rashes, wounds, or scars  Lymphatic: no submandibular or cervical LAD  Neurologic: AAOx3; CNII-XII grossly intact; no focal deficits  Psychiatric: affect and characteristics of appearance, verbalizations, behaviors are appropriate .  VITAL SIGNS:  T(C): 36.8 (04-06-21 @ 10:00), Max: 38.9 (04-06-21 @ 02:20)  T(F): 98.2 (04-06-21 @ 10:00), Max: 102.1 (04-06-21 @ 02:20)  HR: 89 (04-06-21 @ 10:00) (89 - 105)  BP: 130/73 (04-06-21 @ 10:00) (112/60 - 134/84)  BP(mean): --  RR: 20 (04-06-21 @ 10:00) (20 - 26)  SpO2: 97% (04-06-21 @ 10:00) (94% - 97%)  Wt(kg): --    PHYSICAL EXAM:    Constitutional: Elderly patient, lethargic and sleeping in bed. Intermittently moaning and states that she is uncomfortable.   Head: NC/AT  Eyes: PERRL, EOMI, anicteric sclera  ENT: no nasal discharge; uvula midline, no oropharyngeal erythema or exudates; MMM  Neck: supple; no JVD or thyromegaly  Respiratory: CTA B/L; no W/R/R, no retractions  Cardiac: +S1/S2; RRR; no M/R/G; PMI non-displaced  Gastrointestinal: abdomen soft, mildly tender to palpation diffusely, +BSx4  Genitourinary: suprapubic catheter in place draining dark yellow urine  Back: Stage 2 sacral decubitus ulcer  Extremities: WWP, no clubbing or cyanosis; no peripheral edema  Musculoskeletal: NROM x4; no joint swelling, tenderness or erythema  Vascular: 2+ radial, femoral, DP/PT pulses B/L  Dermatologic: diffuse scratch marks on body (aide states patient scratches a lot)  Neurologic: AAOx1; Able to move all extremities and roll from side to side

## 2021-04-06 NOTE — H&P ADULT - NSHPLABSRESULTS_GEN_ALL_CORE
.  LABS:                         13.3   15.35 )-----------( 226      ( 2021 03:16 )             39.8         135  |  96  |  11  ----------------------------<  187<H>  4.0   |  27  |  0.47<L>    Ca    11.2<H>      2021 03:17    TPro  7.4  /  Alb  4.1  /  TBili  1.1  /  DBili  x   /  AST  17  /  ALT  15  /  AlkPhos  60  -    PT/INR - ( 2021 03:14 )   PT: 15.9 sec;   INR: 1.34          PTT - ( 2021 03:14 )  PTT:38.0 sec  Urinalysis Basic - ( 2021 03:20 )    Color: Yellow / Appearance: Clear / S.015 / pH: x  Gluc: x / Ketone: NEGATIVE  / Bili: Small / Urobili: 1.0 E.U./dL   Blood: x / Protein: 100 mg/dL / Nitrite: POSITIVE   Leuk Esterase: Moderate / RBC: < 5 /HPF / WBC Many /HPF   Sq Epi: x / Non Sq Epi: 0-5 /HPF / Bacteria: Many /HPF          Serum Pro-Brain Natriuretic Peptide: 138 pg/mL ( @ 03:17)    Lactate, Blood: 2.8 mmol/L ( @ 05:59)  Lactate, Blood: 3.0 mmol/L ( @ 03:14)      RADIOLOGY, EKG & ADDITIONAL TESTS: Reviewed.

## 2021-04-06 NOTE — H&P ADULT - HISTORY OF PRESENT ILLNESS
76F PMHx chronic inflammatory demyelinating syndrome, GERD, hypothyroidism, constipation, chronic UTIs w/ suprapubic catheter, neurogenic bladder, dropped foot, broken hip fracture in 2016 (not repaired), shoulder injury (repaired 2016), and rotator cuff tear w/ 24 hour healthcare d/t injuries presents for cough, fever, and increased confusion noted by HHA.    In the ED:  Initial vital signs: Tmax 102.1, HR: 104, BP: 134/84, R: 26, SpO2: 90% on RA-->94% on 3L NC  ED course:   Labs: significant for WBC 15.35, PTT 38, PT 15.9, INR 1.34, D-dimer 4007, Lactate 3, Ca 11.2, CRP 69.6, Procal 0.39, Ferritin 428, + U/A  Imaging:  CTA PE protocol: Negative for pulmonary embolism. Minimal interstitial pulmonary edema with pulmonary venous congestion.  EKG: NSR  Medications: tylenol IV 1g x1, benadryl 50mg IV, solumedrol 125mg IVP, Zosyn 3.375 IV x1, 1L NS, duoneb X1  Consults: none   76F PMHx PE (2016 on Eliquis), CIDP, constipation, chronic UTIs w/ suprapubic catheter (last changed 1 week ago), neurogenic bladder, GERD, hypothyroidism, dropped foot, broken hip fracture in 2016 (not repaired), shoulder injury (repaired 2016), and rotator cuff tear w/ 24 hour healthcare d/t injuries presents for cough, fever, and increased confusion noted by HHA. Per the aide, for the past 5 days, the patient started becoming more confused and developed a dry, non-productive cough. At baseline, patient is fully alert and oriented and reads books and answers emails. However, she has become AOx1-2 for past few days. Yesterday the patient felt warm and the aide noted an oral temperature of 100.1 that kept returning regardless of treatment with Tylenol. Patient had SaO2 of 90% at home and improved with 2L NC (which she has from a previous hospital d/c in 2016- but has not required in 2 years). Of note, the patient completed the Moderna COVID vaccination a month ago. Her last bowel movement was 4 days ago. Yesterday the aide gave her an enema but she still has not had a BM. Of note, she is intermittently constipated because of her neurogenic issues. 1 month ago, the patient was started on Linzess to help with her constipation. Patient follows with [facility] visiting doctors for primary care.     In the ED:  Initial vital signs: Tmax 102.1, HR: 104, BP: 134/84, R: 26, SpO2: 90% on RA-->94% on 3L NC  ED course:   Labs: significant for WBC 15.35, PTT 38, PT 15.9, INR 1.34, D-dimer 4007, Lactate 3, Ca 11.2, CRP 69.6, Procal 0.39, Ferritin 428, + U/A  Imaging:  CTA PE protocol: Negative for pulmonary embolism. Minimal interstitial pulmonary edema with pulmonary venous congestion.  EKG: NSR  Medications: tylenol IV 1g x1, benadryl 50mg IV, solumedrol 125mg IVP, Zosyn 3.375 IV x1, 1L NS, duoneb X1  Consults: none

## 2021-04-06 NOTE — ED PROVIDER NOTE - PHYSICAL EXAMINATION
Vitals reviewed  Gen: chronically ill appearing, nad, answers few questions, no dyspnea, + hypoxia 90% on RA  Skin: warm, poor turgor, no rash/lesions  HEENT: ncat, eomi, dry oral mucosa   CV: +tachycardia, regular rhythm, no audible m/r/g  Resp: symmetrical expansion, b/l breath sounds, no w/r/r  Abd: nondistended, soft, mild suprapubic ttp, no rebound/guarding, no cvat  Ext: GARCIA, SILT, distal pulses intact  Neuro: alert/oriented to self/place, no focal deficits

## 2021-04-06 NOTE — ED PROVIDER NOTE - PROGRESS NOTE DETAILS
FLUIDS NOT GIVEN WEIGHT BASED AS CONSIDERING COVID AS POSSIBLE DIAGNOSIS + severe sepsis, elevated lactate. ddimer 4000, no focal infiltrate noted on CXR.  will c/s ICU for severe sepsis, hypoxia and obtain CTA chest (needs premedication) ICU recommends regional admission pending results of CTA, can reconsult if concerning findings on CTA Dima- received signout. awaiting ct results. plan for admission. disposition pending [facility]- ct chest negative for PE rather congestion. plan to admit patient for further treatment UTI/urosepsis. pt and HHA agreeable to plan.

## 2021-04-06 NOTE — H&P ADULT - ATTENDING COMMENTS
Pt appears clinically stable at this time although requiring supplemental oxygen; able to converse and as per aide at bedside - mental status is improved from initial ED presentation; C/w Zosyn for coverage of UTI - can likely switch to CTX in AM. Attempt to wean supplemental oxygen encourage OOB - monitor for signs of improvement. Repeat LA - unclear etiology of elevation at this time - pt appears to be perfusing well making adequate UOP, no overt signs of ischemia

## 2021-04-06 NOTE — ED ADULT NURSE NOTE - CHIEF COMPLAINT QUOTE
Per EMS, HHA reported fever, dark colored urine and AMS since yesterday. 3L NC applied by EMS for SpO2 90% RA. Baseline AOx3, upon arrival AOx1. Indwelling urinary catheter noted. .

## 2021-04-06 NOTE — H&P ADULT - ASSESSMENT
76F PMHx PE (2016 on Eliquis), CIDP, constipation, chronic UTIs w/ suprapubic catheter (last changed 1 week ago), neurogenic bladder, GERD, hypothyroidism, dropped foot, broken hip fracture in 2016 (not repaired), shoulder injury (repaired 2016), and rotator cuff tear w/ 24 hour healthcare d/t injuries presents for cough, fever, and increased confusion noted by HHA admitted for hypoxic respiratory failure and for severe sepsis 2/2 UTI.

## 2021-04-06 NOTE — H&P ADULT - PROBLEM SELECTOR PLAN 8
Patient with hx of anxiety. On home valium 10mg PRN and mirtazapine 15mg qhs.   - Will hold home meds now in the setting of somnolence and confusion.   - Will consider restarting mirtazapine when patient more alert

## 2021-04-07 ENCOUNTER — TRANSCRIPTION ENCOUNTER (OUTPATIENT)
Age: 76
End: 2021-04-07

## 2021-04-07 DIAGNOSIS — T83.511A INFECTION AND INFLAMMATORY REACTION DUE TO INDWELLING URETHRAL CATHETER, INITIAL ENCOUNTER: ICD-10-CM

## 2021-04-07 LAB
24R-OH-CALCIDIOL SERPL-MCNC: 29.3 NG/ML — LOW (ref 30–80)
ALBUMIN SERPL ELPH-MCNC: 3.3 G/DL — SIGNIFICANT CHANGE UP (ref 3.3–5)
ALP SERPL-CCNC: 45 U/L — SIGNIFICANT CHANGE UP (ref 40–120)
ALT FLD-CCNC: 11 U/L — SIGNIFICANT CHANGE UP (ref 10–45)
ANION GAP SERPL CALC-SCNC: 10 MMOL/L — SIGNIFICANT CHANGE UP (ref 5–17)
AST SERPL-CCNC: 13 U/L — SIGNIFICANT CHANGE UP (ref 10–40)
BASOPHILS # BLD AUTO: 0.03 K/UL — SIGNIFICANT CHANGE UP (ref 0–0.2)
BASOPHILS NFR BLD AUTO: 0.2 % — SIGNIFICANT CHANGE UP (ref 0–2)
BILIRUB SERPL-MCNC: 0.8 MG/DL — SIGNIFICANT CHANGE UP (ref 0.2–1.2)
BUN SERPL-MCNC: 14 MG/DL — SIGNIFICANT CHANGE UP (ref 7–23)
CALCIUM SERPL-MCNC: 10.1 MG/DL — SIGNIFICANT CHANGE UP (ref 8.4–10.5)
CALCIUM SERPL-MCNC: 10.2 MG/DL — SIGNIFICANT CHANGE UP (ref 8.4–10.5)
CHLORIDE SERPL-SCNC: 105 MMOL/L — SIGNIFICANT CHANGE UP (ref 96–108)
CO2 SERPL-SCNC: 25 MMOL/L — SIGNIFICANT CHANGE UP (ref 22–31)
COVID-19 NUCLEOCAPSID GAM AB INTERP: NEGATIVE — SIGNIFICANT CHANGE UP
COVID-19 NUCLEOCAPSID TOTAL GAM ANTIBODY RESULT: 0.08 INDEX — SIGNIFICANT CHANGE UP
COVID-19 SPIKE DOMAIN AB INTERP: POSITIVE
COVID-19 SPIKE DOMAIN ANTIBODY RESULT: >250 U/ML — HIGH
CREAT SERPL-MCNC: 0.38 MG/DL — LOW (ref 0.5–1.3)
EOSINOPHIL # BLD AUTO: 0.05 K/UL — SIGNIFICANT CHANGE UP (ref 0–0.5)
EOSINOPHIL NFR BLD AUTO: 0.4 % — SIGNIFICANT CHANGE UP (ref 0–6)
GLUCOSE SERPL-MCNC: 136 MG/DL — HIGH (ref 70–99)
HCT VFR BLD CALC: 36.7 % — SIGNIFICANT CHANGE UP (ref 34.5–45)
HCV AB S/CO SERPL IA: 0.18 S/CO — SIGNIFICANT CHANGE UP
HCV AB SERPL-IMP: SIGNIFICANT CHANGE UP
HGB BLD-MCNC: 11.9 G/DL — SIGNIFICANT CHANGE UP (ref 11.5–15.5)
IMM GRANULOCYTES NFR BLD AUTO: 0.6 % — SIGNIFICANT CHANGE UP (ref 0–1.5)
LACTATE SERPL-SCNC: 1.7 MMOL/L — SIGNIFICANT CHANGE UP (ref 0.5–2)
LEGIONELLA AG UR QL: NEGATIVE — SIGNIFICANT CHANGE UP
LYMPHOCYTES # BLD AUTO: 1.07 K/UL — SIGNIFICANT CHANGE UP (ref 1–3.3)
LYMPHOCYTES # BLD AUTO: 8.4 % — LOW (ref 13–44)
MAGNESIUM SERPL-MCNC: 1.9 MG/DL — SIGNIFICANT CHANGE UP (ref 1.6–2.6)
MCHC RBC-ENTMCNC: 30.2 PG — SIGNIFICANT CHANGE UP (ref 27–34)
MCHC RBC-ENTMCNC: 32.4 GM/DL — SIGNIFICANT CHANGE UP (ref 32–36)
MCV RBC AUTO: 93.1 FL — SIGNIFICANT CHANGE UP (ref 80–100)
MONOCYTES # BLD AUTO: 0.92 K/UL — HIGH (ref 0–0.9)
MONOCYTES NFR BLD AUTO: 7.2 % — SIGNIFICANT CHANGE UP (ref 2–14)
NEUTROPHILS # BLD AUTO: 10.6 K/UL — HIGH (ref 1.8–7.4)
NEUTROPHILS NFR BLD AUTO: 83.2 % — HIGH (ref 43–77)
NRBC # BLD: 0 /100 WBCS — SIGNIFICANT CHANGE UP (ref 0–0)
PHOSPHATE SERPL-MCNC: 1.9 MG/DL — LOW (ref 2.5–4.5)
PLATELET # BLD AUTO: 203 K/UL — SIGNIFICANT CHANGE UP (ref 150–400)
POTASSIUM SERPL-MCNC: 3.7 MMOL/L — SIGNIFICANT CHANGE UP (ref 3.5–5.3)
POTASSIUM SERPL-SCNC: 3.7 MMOL/L — SIGNIFICANT CHANGE UP (ref 3.5–5.3)
PROT SERPL-MCNC: 6.6 G/DL — SIGNIFICANT CHANGE UP (ref 6–8.3)
PTH-INTACT FLD-MCNC: 71 PG/ML — HIGH (ref 15–65)
RBC # BLD: 3.94 M/UL — SIGNIFICANT CHANGE UP (ref 3.8–5.2)
RBC # FLD: 13.1 % — SIGNIFICANT CHANGE UP (ref 10.3–14.5)
S PNEUM AG UR QL: NEGATIVE — SIGNIFICANT CHANGE UP
SARS-COV-2 IGG+IGM SERPL QL IA: 0.08 INDEX — SIGNIFICANT CHANGE UP
SARS-COV-2 IGG+IGM SERPL QL IA: >250 U/ML — HIGH
SARS-COV-2 IGG+IGM SERPL QL IA: NEGATIVE — SIGNIFICANT CHANGE UP
SARS-COV-2 IGG+IGM SERPL QL IA: POSITIVE
SARS-COV-2 RNA SPEC QL NAA+PROBE: SIGNIFICANT CHANGE UP
SODIUM SERPL-SCNC: 140 MMOL/L — SIGNIFICANT CHANGE UP (ref 135–145)
VIT D25+D1,25 OH+D1,25 PNL SERPL-MCNC: 59.4 PG/ML — SIGNIFICANT CHANGE UP (ref 19.9–79.3)
WBC # BLD: 12.75 K/UL — HIGH (ref 3.8–10.5)
WBC # FLD AUTO: 12.75 K/UL — HIGH (ref 3.8–10.5)

## 2021-04-07 PROCEDURE — 74176 CT ABD & PELVIS W/O CONTRAST: CPT | Mod: 26

## 2021-04-07 PROCEDURE — 99232 SBSQ HOSP IP/OBS MODERATE 35: CPT

## 2021-04-07 PROCEDURE — 99221 1ST HOSP IP/OBS SF/LOW 40: CPT

## 2021-04-07 PROCEDURE — 99233 SBSQ HOSP IP/OBS HIGH 50: CPT

## 2021-04-07 PROCEDURE — 93306 TTE W/DOPPLER COMPLETE: CPT | Mod: 26

## 2021-04-07 PROCEDURE — 70450 CT HEAD/BRAIN W/O DYE: CPT | Mod: 26

## 2021-04-07 RX ORDER — PIPERACILLIN AND TAZOBACTAM 4; .5 G/20ML; G/20ML
3.38 INJECTION, POWDER, LYOPHILIZED, FOR SOLUTION INTRAVENOUS EVERY 6 HOURS
Refills: 0 | Status: DISCONTINUED | OUTPATIENT
Start: 2021-04-07 | End: 2021-04-08

## 2021-04-07 RX ORDER — VANCOMYCIN HCL 1 G
1000 VIAL (EA) INTRAVENOUS EVERY 24 HOURS
Refills: 0 | Status: DISCONTINUED | OUTPATIENT
Start: 2021-04-07 | End: 2021-04-08

## 2021-04-07 RX ORDER — MULTIVIT WITH MIN/MFOLATE/K2 340-15/3 G
1 POWDER (GRAM) ORAL ONCE
Refills: 0 | Status: COMPLETED | OUTPATIENT
Start: 2021-04-07 | End: 2021-04-07

## 2021-04-07 RX ORDER — VANCOMYCIN HCL 1 G
1000 VIAL (EA) INTRAVENOUS EVERY 12 HOURS
Refills: 0 | Status: DISCONTINUED | OUTPATIENT
Start: 2021-04-07 | End: 2021-04-07

## 2021-04-07 RX ORDER — ACETAMINOPHEN 500 MG
650 TABLET ORAL EVERY 6 HOURS
Refills: 0 | Status: DISCONTINUED | OUTPATIENT
Start: 2021-04-07 | End: 2021-04-08

## 2021-04-07 RX ORDER — KETOROLAC TROMETHAMINE 30 MG/ML
15 SYRINGE (ML) INJECTION ONCE
Refills: 0 | Status: DISCONTINUED | OUTPATIENT
Start: 2021-04-07 | End: 2021-04-07

## 2021-04-07 RX ORDER — SODIUM,POTASSIUM PHOSPHATES 278-250MG
2 POWDER IN PACKET (EA) ORAL ONCE
Refills: 0 | Status: COMPLETED | OUTPATIENT
Start: 2021-04-07 | End: 2021-04-07

## 2021-04-07 RX ORDER — LANOLIN ALCOHOL/MO/W.PET/CERES
5 CREAM (GRAM) TOPICAL AT BEDTIME
Refills: 0 | Status: DISCONTINUED | OUTPATIENT
Start: 2021-04-07 | End: 2021-04-08

## 2021-04-07 RX ADMIN — Medication 15 MILLIGRAM(S): at 21:48

## 2021-04-07 RX ADMIN — Medication 650 MILLIGRAM(S): at 23:08

## 2021-04-07 RX ADMIN — PANTOPRAZOLE SODIUM 40 MILLIGRAM(S): 20 TABLET, DELAYED RELEASE ORAL at 06:20

## 2021-04-07 RX ADMIN — Medication 15 MILLIGRAM(S): at 21:33

## 2021-04-07 RX ADMIN — APIXABAN 5 MILLIGRAM(S): 2.5 TABLET, FILM COATED ORAL at 18:19

## 2021-04-07 RX ADMIN — Medication 5 MILLIGRAM(S): at 22:08

## 2021-04-07 RX ADMIN — PIPERACILLIN AND TAZOBACTAM 200 GRAM(S): 4; .5 INJECTION, POWDER, LYOPHILIZED, FOR SOLUTION INTRAVENOUS at 00:00

## 2021-04-07 RX ADMIN — Medication 50 MICROGRAM(S): at 05:09

## 2021-04-07 RX ADMIN — PIPERACILLIN AND TAZOBACTAM 200 GRAM(S): 4; .5 INJECTION, POWDER, LYOPHILIZED, FOR SOLUTION INTRAVENOUS at 18:18

## 2021-04-07 RX ADMIN — APIXABAN 5 MILLIGRAM(S): 2.5 TABLET, FILM COATED ORAL at 05:09

## 2021-04-07 RX ADMIN — Medication 650 MILLIGRAM(S): at 22:08

## 2021-04-07 RX ADMIN — Medication 250 MILLIGRAM(S): at 18:16

## 2021-04-07 RX ADMIN — POLYETHYLENE GLYCOL 3350 17 GRAM(S): 17 POWDER, FOR SOLUTION ORAL at 05:09

## 2021-04-07 RX ADMIN — Medication 1 DROP(S): at 05:10

## 2021-04-07 RX ADMIN — Medication 1 DROP(S): at 12:03

## 2021-04-07 RX ADMIN — POLYETHYLENE GLYCOL 3350 17 GRAM(S): 17 POWDER, FOR SOLUTION ORAL at 18:19

## 2021-04-07 RX ADMIN — PIPERACILLIN AND TAZOBACTAM 200 GRAM(S): 4; .5 INJECTION, POWDER, LYOPHILIZED, FOR SOLUTION INTRAVENOUS at 13:26

## 2021-04-07 RX ADMIN — PIPERACILLIN AND TAZOBACTAM 200 GRAM(S): 4; .5 INJECTION, POWDER, LYOPHILIZED, FOR SOLUTION INTRAVENOUS at 06:00

## 2021-04-07 RX ADMIN — Medication 5 MILLIGRAM(S): at 22:53

## 2021-04-07 RX ADMIN — SENNA PLUS 2 TABLET(S): 8.6 TABLET ORAL at 22:08

## 2021-04-07 RX ADMIN — Medication 1 DROP(S): at 21:33

## 2021-04-07 NOTE — DIETITIAN INITIAL EVALUATION ADULT. - PROBLEM SELECTOR PLAN 6
Date: 4/21/2025        Patient Name: Lianet Fan          To Whom it may concern:    This letter has been written at the patient's request. The above patient was seen at [facility] for treatment of a medical condition.    .          Sincerely,                Kenneth Batres MD      
Patient with hx of hypothyroid on levothyroxine at home.   - C/w levothyroxine 50mcg qd

## 2021-04-07 NOTE — DIETITIAN INITIAL EVALUATION ADULT. - ADD RECOMMEND
1. Recommend SLP assess for appropriate texture modifications 2. Add Ensure Enlive BID (700 kcals, 40g pro) 3. Recommend provide MVI, Zinc sulfate, Vit C for wound healing 4. Appreciate continued support and encouragement at meal times

## 2021-04-07 NOTE — CHART NOTE - NSCHARTNOTEFT_GEN_A_CORE
INFECTIOUS DISEASES BRIEF NOTE    Called to comment on COVID-19 isolation clearance.    76F h/o PE, recurrent UTI w/ suprapubic cath, neurogenic bladder, COVID-19 Moderna vaccine completed a month ago p/w fever, confusion and cough.  She is hypoxic to 90% with improvement with 2LNC. WBC 15, ferritin 400s, CRP 60s, Procal 0.55, di-dimer 4000s. COVID PCR neg x2, COVID-19 spike protein ab+, nucleocupsid ab-.  UA positive.  UCx pending. CTPE showed negative PE, minimal interstitial pulmonary edema with pulmonary venous congestion.  She likely has sepsis due to UTI.  Hypoxia suspected to be due to aspiration in setting of AMS.  OK to discontinue isolation.      If you have any questions, please feel free to contact me.    Tawny Love MD, MS  Infectious Disease attending  work cell [phone]

## 2021-04-07 NOTE — DIETITIAN INITIAL EVALUATION ADULT. - PROBLEM SELECTOR PLAN 10
F: s/p 1L NS, will get 1 more L NS  E: Replete PRN  N: Regular   GI PPx: Protonix 40 qd  DVT PPx: Eliquis 5mg bid  Dispo: [email or web address]

## 2021-04-07 NOTE — PROGRESS NOTE ADULT - PROBLEM SELECTOR PLAN 3
Patient has not had BM in 4 days. Taking Linzess and miralax bid at home but struggles to pass BM d/t neurogenic issues.   - Started on bowel regimen: Miralax bid, senna 2 tab qhs, lactulose 20mL, dulcolax suppository, tap water enema  - f/u BM Patient has not had BM in 4 days. Taking Linzess and miralax bid at home but struggles to pass BM d/t neurogenic issues.   - Started on bowel regimen: Miralax bid, senna 2 tab qhs, lactulose 20mL, dulcolax suppository, tap water enema  - patient had BM 4/7 s/p enema

## 2021-04-07 NOTE — CONSULT NOTE ADULT - PROBLEM SELECTOR RECOMMENDATION 9
-SPT changed by  using sterile method-18 fr draining well  -no emergent  intervention at this time  -f/u Ucx sensitivities and consider ID consult for antibiotic course management  -rest of care as per primary team  -may follow up with  [facility] if unable to be seen by her urologist although preferred to continue care at established office upon discharge -SPT changed by  using sterile method-18 fr draining well  -no emergent  intervention at this time  -recommend abdominal/pelvic imaging consider CT a/p  -f/u Ucx sensitivities and consider ID consult for antibiotic course management  -rest of care as per primary team  -may follow up with  [facility] clinic if unable to be seen by her urologist although preferred to continue care at established office upon discharge

## 2021-04-07 NOTE — PROGRESS NOTE ADULT - SUBJECTIVE AND OBJECTIVE BOX
TRANSFER NOTE: COVID RMF TO NON-COVID [facility]     HOSPITAL COURSE:  76F PMHx PE (2016 on Eliquis), CIDP, constipation, chronic UTIs w/ suprapubic catheter (last changed 1 week ago), neurogenic bladder, GERD, hypothyroidism, dropped foot, broken hip fracture in 2016 (not repaired), shoulder injury (repaired 2016), and rotator cuff tear w/ 24 hour healthcare d/t injuries presents for cough, fever, and increased confusion noted by HHA.Patient had SaO2 of 90% at home and improved with 2L NC (which she has from a previous hospital d/c in 2016- but has not required in 2 years). Of note, the patient completed the Moderna COVID vaccination a month ago. In ED vital signs: Tmax 102.1, HR: 104, BP: 134/84, R: 26, SpO2: 90% on RA-->94% on 3L NC. Labs: significant for WBC 15.35, D-dimer 4007, Lactate 3, Ca 11.2, CRP 69.6, Procal 0.39, Ferritin 428, + U/A. CT PE w/ minimal interstitial pulmonary edema with pulmonary venous congestion. Negative for PE. Patient admitted on to 2w for r/o covid dt unknown cause of hypoxia. Patient started on zosyn 3.375 q6h for sepsis likely 2/2 to UTI vs PNA. Etiology of hypoxia still unknown, COVID PCR negative x2, CT PE negative, c/f possible aspiration PNA given risk factors. TTE and workup for atypical PNA pending. CTH head ordered for altered mental status. Constipation improved s/p enema. Patient covid PCR negative x2, Covid ruled out and now being transferred to non-covid [facility] for further management.     OVERNIGHT EVENTS: No acute events.     SUBJECTIVE / INTERVAL HPI: Patient seen and examined at bedside. AAOx2 (oriented to self, [facility]). Pt anxious and confused.     VITAL SIGNS:  Vital Signs Last 24 Hrs  T(C): 36 (2021 06:18), Max: 37.7 (2021 14:45)  T(F): 96.8 (2021 06:18), Max: 99.8 (2021 14:45)  HR: 56 (2021 06:18) (56 - 89)  BP: 118/58 (2021 06:18) (103/70 - 119/64)  BP(mean): 78 (2021 16:16) (78 - 78)  RR: 18 (2021 06:18) (16 - 18)  SpO2: 95% (2021 06:18) (95% - 95%)    21 @ 07:01  -  21 @ 07:00  --------------------------------------------------------  IN: 0 mL / OUT: 550 mL / NET: -550 mL    21 @ 07:01  -  21 @ 12:00  --------------------------------------------------------  IN: 0 mL / OUT: 800 mL / NET: -800 mL        PHYSICAL EXAM:    General: WDWN  HEENT: NC/AT; PERRL, anicteric sclera; MMM  Neck: supple  Cardiovascular: +S1/S2; RRR  Respiratory: CTA B/L; no W/R/R  Gastrointestinal: soft, NT/ND; +BSx4  Extremities: WWP; no edema, clubbing or cyanosis  Vascular: 2+ radial, DP/PT pulses B/L  Neurological: AAOx3; no focal deficits    MEDICATIONS:  MEDICATIONS  (STANDING):  apixaban 5 milliGRAM(s) Oral every 12 hours  artificial  tears Solution 1 Drop(s) Both EYES four times a day  bisacodyl Suppository 10 milliGRAM(s) Rectal daily  lactulose Syrup 20 Gram(s) Oral once  levothyroxine 50 MICROGram(s) Oral daily  magnesium citrate Oral Solution 1 Bottle Oral once  pantoprazole    Tablet 40 milliGRAM(s) Oral before breakfast  piperacillin/tazobactam IVPB.. 3.375 Gram(s) IV Intermittent every 6 hours  polyethylene glycol 3350 17 Gram(s) Oral two times a day  potassium phosphate / sodium phosphate Powder (PHOS-NaK) 2 Packet(s) Oral once  senna 2 Tablet(s) Oral at bedtime    MEDICATIONS  (PRN):      ALLERGIES:  Allergies    angiotensin converting enzyme inhibitors (Unknown)  IV Contrast (Unknown)  nitrofurantoin (Unknown)    Intolerances        LABS:                        11.9   12.75 )-----------( 203      ( 2021 10:08 )             36.7     04-07    140  |  105  |  14  ----------------------------<  136<H>  3.7   |  25  |  0.38<L>    Ca    10.2      2021 10:08  Phos  1.9     04-07  Mg     1.9     04-07    TPro  6.6  /  Alb  3.3  /  TBili  0.8  /  DBili  x   /  AST  13  /  ALT  11  /  AlkPhos  45  04-07    PT/INR - ( 2021 03:14 )   PT: 15.9 sec;   INR: 1.34          PTT - ( 2021 03:14 )  PTT:38.0 sec  Urinalysis Basic - ( 2021 03:20 )    Color: Yellow / Appearance: Clear / S.015 / pH: x  Gluc: x / Ketone: NEGATIVE  / Bili: Small / Urobili: 1.0 E.U./dL   Blood: x / Protein: 100 mg/dL / Nitrite: POSITIVE   Leuk Esterase: Moderate / RBC: < 5 /HPF / WBC Many /HPF   Sq Epi: x / Non Sq Epi: 0-5 /HPF / Bacteria: Many /HPF      CAPILLARY BLOOD GLUCOSE      POCT Blood Glucose.: 186 mg/dL (2021 02:08)        RADIOLOGY & ADDITIONAL TESTS: Reviewed.   TRANSFER NOTE: COVID RMF TO NON-COVID [facility]     HOSPITAL COURSE:  76F PMHx PE (2016 on Eliquis), CIDP, constipation, chronic UTIs w/ suprapubic catheter (last changed 1 week ago), neurogenic bladder, GERD, hypothyroidism, dropped foot, broken hip fracture in 2016 (not repaired), shoulder injury (repaired 2016), and rotator cuff tear w/ 24 hour healthcare d/t injuries presents for cough, fever, and increased confusion noted by HHA.Patient had SaO2 of 90% at home and improved with 2L NC (which she has from a previous hospital d/c in 2016- but has not required in 2 years). Of note, the patient completed the Moderna COVID vaccination a month ago. In ED vital signs: Tmax 102.1, HR: 104, BP: 134/84, R: 26, SpO2: 90% on RA-->94% on 3L NC. Labs: significant for WBC 15.35, D-dimer 4007, Lactate 3, Ca 11.2, CRP 69.6, Procal 0.39, Ferritin 428, + U/A. CT PE w/ minimal interstitial pulmonary edema with pulmonary venous congestion. Negative for PE. Patient admitted on to 2w for r/o covid dt unknown cause of hypoxia. Patient started on zosyn 3.375 q6h for sepsis likely 2/2 to UTI vs PNA. Etiology of hypoxia still unknown, COVID PCR negative x2, CT PE negative, c/f possible aspiration PNA given risk factors. TTE and workup for atypical PNA pending. CTH head ordered for altered mental status. Constipation improved s/p enema. Patient covid PCR negative x2, Covid ruled out and now being transferred to non-covid [facility] for further management.     OVERNIGHT EVENTS: No acute events.     SUBJECTIVE / INTERVAL HPI: Patient seen and examined at bedside. AAOx2 (oriented to self, [facility]). Pt anxious and confused. States she feels frightened that she is alone. Reports chronic abdominal pain. She denies HA, F/C, SOB, CP, palpitations, N/V, LE pain. ROS otherwise negative.     VITAL SIGNS:  Vital Signs Last 24 Hrs  T(C): 36 (2021 06:18), Max: 37.7 (2021 14:45)  T(F): 96.8 (2021 06:18), Max: 99.8 (2021 14:45)  HR: 56 (2021 06:18) (56 - 89)  BP: 118/58 (2021 06:18) (103/70 - 119/64)  BP(mean): 78 (2021 16:16) (78 - 78)  RR: 18 (2021 06:18) (16 - 18)  SpO2: 95% (2021 06:18) (95% - 95%)    21 @ 07:01  -  21 @ 07:00  --------------------------------------------------------  IN: 0 mL / OUT: 550 mL / NET: -550 mL    21 @ 07:01  -  21 @ 12:00  --------------------------------------------------------  IN: 0 mL / OUT: 800 mL / NET: -800 mL        PHYSICAL EXAM:    General: Frail elderly female; NAD   HEENT: NC/AT; PERRL, anicteric sclera; MMM  Neck: supple  Cardiovascular: +S1/S2; RRR  Respiratory: CTA B/L; no W/R/R  Gastrointestinal: soft, mildly tender to palpation diffusely; +BSx4  : suprapubic catheter in place draining dark yellow urine  Extremities: WWP; no edema, clubbing or cyanosis  Back: Stage 2 sacral decubitus ulcer  Vascular: 2+ radial, DP/PT pulses B/L  Dermatologic: diffuse scratch marks on body (aide states patient scratches a lot)  Neurological: AAOx2 (oriented to self, [facility]); no focal deficits    MEDICATIONS:  MEDICATIONS  (STANDING):  apixaban 5 milliGRAM(s) Oral every 12 hours  artificial  tears Solution 1 Drop(s) Both EYES four times a day  bisacodyl Suppository 10 milliGRAM(s) Rectal daily  lactulose Syrup 20 Gram(s) Oral once  levothyroxine 50 MICROGram(s) Oral daily  magnesium citrate Oral Solution 1 Bottle Oral once  pantoprazole    Tablet 40 milliGRAM(s) Oral before breakfast  piperacillin/tazobactam IVPB.. 3.375 Gram(s) IV Intermittent every 6 hours  polyethylene glycol 3350 17 Gram(s) Oral two times a day  potassium phosphate / sodium phosphate Powder (PHOS-NaK) 2 Packet(s) Oral once  senna 2 Tablet(s) Oral at bedtime    MEDICATIONS  (PRN):      ALLERGIES:  Allergies    angiotensin converting enzyme inhibitors (Unknown)  IV Contrast (Unknown)  nitrofurantoin (Unknown)    Intolerances        LABS:                        11.9   12.75 )-----------( 203      ( 2021 10:08 )             36.7     04-07    140  |  105  |  14  ----------------------------<  136<H>  3.7   |  25  |  0.38<L>    Ca    10.2      2021 10:08  Phos  1.9     04-07  Mg     1.9     04-07    TPro  6.6  /  Alb  3.3  /  TBili  0.8  /  DBili  x   /  AST  13  /  ALT  11  /  AlkPhos  45  04-07    PT/INR - ( 2021 03:14 )   PT: 15.9 sec;   INR: 1.34          PTT - ( 2021 03:14 )  PTT:38.0 sec  Urinalysis Basic - ( 2021 03:20 )    Color: Yellow / Appearance: Clear / S.015 / pH: x  Gluc: x / Ketone: NEGATIVE  / Bili: Small / Urobili: 1.0 E.U./dL   Blood: x / Protein: 100 mg/dL / Nitrite: POSITIVE   Leuk Esterase: Moderate / RBC: < 5 /HPF / WBC Many /HPF   Sq Epi: x / Non Sq Epi: 0-5 /HPF / Bacteria: Many /HPF      CAPILLARY BLOOD GLUCOSE      POCT Blood Glucose.: 186 mg/dL (2021 02:08)        RADIOLOGY & ADDITIONAL TESTS: Reviewed.

## 2021-04-07 NOTE — PROGRESS NOTE ADULT - PROBLEM SELECTOR PLAN 10
F: s/p 1L NS, will get 1 more L NS  E: Replete PRN  N: Regular   GI PPx: Protonix 40 qd  DVT PPx: Eliquis 5mg bid  Dispo: [facility]

## 2021-04-07 NOTE — DIETITIAN INITIAL EVALUATION ADULT. - OTHER INFO
76F PMHx PE (2016 on Eliquis), CIDP, constipation, chronic UTIs w/ suprapubic catheter (last changed 1 week ago), neurogenic bladder, GERD, hypothyroidism, dropped foot, broken hip fracture in 2016 (not repaired), shoulder injury (repaired 2016), and rotator cuff tear w/ 24 hour healthcare d/t injuries presents for cough, fever, and increased confusion noted by HHA admitted for hypoxic respiratory failure and for severe sepsis 2/2 UTI.     On assessment, pt seen in room, preoccupied with the "wires" on her bed- RN made aware after assessment. Pt currently ordered for puree diet reporting fair (~50%) intake d/t decreased appetite. States that she typically consumes soft foods at home, but not puree- see recs below for texture modifications per SLP. States her intake was stable at home- has HHA who helps prepare food; she reports eating ravioli for dinner. Pt endorses consuming 1-2 Boost supplement shakes at home with good tolerance. Says she'll take Ensure but prefers boost; is amenable to receiving Ensure Enlive BID while in the hospital (700 kcal, 40 g pro). Explained indication for ONS to meet increased pro/ kcal needs 2/2 Sepsis and presence of multiple PI. Pt demonstrated understanding. Noted no ht recorded in EMR, pt states she is ~5'7. She is unsure of her wt history at this time. Skin: Stage II PI to sacrum- see recs below for micronutirent supplementation for wound healing optimization. Pain: no complaints of pain during RD assessment GI: Reports constipation resolved with enema this AM. Denies any current n/v. See full recs below, RD to follow.

## 2021-04-07 NOTE — DIETITIAN INITIAL EVALUATION ADULT. - PROBLEM SELECTOR PLAN 4
Fall with Harm Risk Patient presents with Ca of 11.2. No protein gap.  - s/p 1.5L NS  - f/u PTH, PTHrP, Vit D

## 2021-04-07 NOTE — CONSULT NOTE ADULT - ASSESSMENT
76F PMHx PE (2016 on Eliquis), CIDP, constipation, chronic UTIs w/ suprapubic catheter (last changed 1 week ago), neurogenic bladder, GERD, hypothyroidism, dropped foot, broken hip fracture in 2016 (not repaired), shoulder injury (repaired 2016), and rotator cuff tear w/ 24 hour healthcare d/t injuries presents for cough, fever, and increased confusion noted by HHA admitted for hypoxic respiratory failure and for severe sepsis 2/2 MRSA UTI

## 2021-04-07 NOTE — DIETITIAN NUTRITION RISK NOTIFICATION - TREATMENT: THE FOLLOWING DIET HAS BEEN RECOMMENDED
Diet, Pureed:   Supplement Feeding Modality:  Oral  Ensure Enlive Cans or Servings Per Day:  1       Frequency:  Two Times a day (04-07-21 @ 16:14) [Pending Verification By Attending]  Diet, Pureed (04-06-21 @ 15:54) [Active]    Recommendations:  1. Recommend SLP assess for appropriate texture modifications   2. Add Ensure Enlive BID (700 kcals, 40g pro)   3. Recommend provide MVI, Zinc sulfate, Vit C for wound healing   4. Appreciate continued support and encouragement at meal times

## 2021-04-07 NOTE — PROGRESS NOTE ADULT - PROBLEM SELECTOR PLAN 1
Patient with 5 days of increasing confusion and fever. On admission, noted to have Tmax 102, , WBC 15, and RR 26 with positive UA and lactate 3.0. Patient with chronic suprapubic catheter in place, last changed last week, and hx of multiple UTIs. Likely severe sepsis 2/2 UTI.   - Suprapubic cath in place, not clogged, changed last week  - s/p Zosyn 3.375mg x1 in ED  - f/u culture  - c/w Zosyn 3.375 IV q8h  - Consult urology to change suprapubic cath in 72 hr Patient with 5 days of increasing confusion and fever. On admission, noted to have Tmax 102, , WBC 15, and RR 26 with positive UA and lactate 3.0. Patient with chronic suprapubic catheter in place, last changed last week, and hx of multiple UTIs. Likely severe sepsis 2/2 UTI.   - Suprapubic cath in place, not clogged, changed last week  - s/p Zosyn 3.375mg x1 in ED  - Ucx + for MRSA  - started vanc 1 g qd   - c/w Zosyn 3.375 IV q8h  - f/u bcx Patient with 5 days of increasing confusion and fever. On admission, noted to have Tmax 102, , WBC 15, and RR 26 with positive UA and lactate 3.0. Patient with chronic suprapubic catheter in place, last changed last week, and hx of multiple UTIs. Likely severe sepsis 2/2 UTI.   - Suprapubic cath in place, not clogged, changed last week  - s/p Zosyn 3.375mg x1 in ED  - Ucx + for MRSA  - started vanc 1 g qd   - c/w Zosyn 3.375 IV q8h  - urology consulted for suprapubic cath exchange  - f/u CT [facility] [place]  - f/u bcx

## 2021-04-07 NOTE — CONSULT NOTE ADULT - ATTENDING COMMENTS
Agree with above plan. F/u imaging.
Ms. Darden is a 77 y/o female with h/o hip fracture, rotator cuff tear, PE on Eliquis now with T102, RR 20/min, SO2 94% on 2L/Min, WBC 15, lactate 3,  has indwelling phelps catheter, A/O 1-2.  She was evaluated with the resident, management decisions made, see above for the details, I agree with the A/P.  -AMS  -UTI  -severe sepsis without shock  -hypothyroidism  -hypoxemia respiratory insufficiency  -GIRDs  -anxiety disorder  >hold valium  >Keep SO2 above 94%  >f/u CTA  >[ammonia]  >c/w Eliquis  >NPO  >empiric antibiotics  >change urinary catheter within 72hr od antibioticcs  >resonsult us as needed.

## 2021-04-07 NOTE — DIETITIAN INITIAL EVALUATION ADULT. - OTHER CALCULATIONS
ABW used for calculations as pt between % of IBW (88%). Needs based on [facility] standards of care for older adults, adjusted for increased pro/ kcal needs 2/2 hypermetabolic state and presence of stage II PI. fluids per team.

## 2021-04-07 NOTE — CONSULT NOTE ADULT - SUBJECTIVE AND OBJECTIVE BOX
Patient is a 76y old  Female who presents with a chief complaint of SOB (07 Apr 2021 07:19)      HPI:  76F PMHx PE (2016 on Eliquis), CIDP, constipation, chronic UTIs w/ suprapubic catheter (last changed 1 week ago), neurogenic bladder, GERD, hypothyroidism, dropped foot, broken hip fracture in 2016 (not repaired), shoulder injury (repaired 2016), and rotator cuff tear w/ 24 hour healthcare d/t injuries presents for cough, fever, and increased confusion noted by HHA. Per the aide, for the past 5 days, the patient started becoming more confused and developed a dry, non-productive cough. At baseline, patient is fully alert and oriented and reads books and answers emails. However, she has become AOx1-2 for past few days. Yesterday the patient felt warm and the aide noted an oral temperature of 100.1 that kept returning regardless of treatment with Tylenol. Patient had SaO2 of 90% at home and improved with 2L NC (which she has from a previous hospital d/c in 2016- but has not required in 2 years). Of note, the patient completed the Moderna COVID vaccination a month ago. Her last bowel movement was 4 days ago. Yesterday the aide gave her an enema but she still has not had a BM. Of note, she is intermittently constipated because of her neurogenic issues. 1 month ago, the patient was started on Linzess to help with her constipation. Patient follows with [facility] visiting doctors for primary care.     : Catheter last changed last week by urologist outpatient. She reports to numerous prior UTI's. In the setting of acute MRSA UTI, catheter changed using sterile method using 18fr phelps catheter. Patient tolerated procedure well.      Vital Signs Last 24 Hrs  T(C): 36 (07 Apr 2021 06:18), Max: 37.7 (06 Apr 2021 14:45)  T(F): 96.8 (07 Apr 2021 06:18), Max: 99.8 (06 Apr 2021 14:45)  HR: 56 (07 Apr 2021 06:18) (56 - 78)  BP: 118/58 (07 Apr 2021 06:18) (103/70 - 119/64)  BP(mean): 78 (06 Apr 2021 16:16) (78 - 78)  RR: 18 (07 Apr 2021 06:18) (16 - 18)  SpO2: 95% (07 Apr 2021 06:18) (95% - 95%)  I&O's Summary    06 Apr 2021 07:01  -  07 Apr 2021 07:00  --------------------------------------------------------  IN: 0 mL / OUT: 550 mL / NET: -550 mL    07 Apr 2021 07:01  -  07 Apr 2021 14:21  --------------------------------------------------------  IN: 0 mL / OUT: 800 mL / NET: -800 mL        PE:  Gen: altered  Abd: soft, nontender, SPT stoma clean without signs of irritation, 18 fr SPT draining yellow clear      LABS:                        11.9   12.75 )-----------( 203      ( 07 Apr 2021 10:08 )             36.7     04-07    140  |  105  |  14  ----------------------------<  136<H>  3.7   |  25  |  0.38<L>    Ca    10.2      07 Apr 2021 10:08  Phos  1.9     04-07  Mg     1.9     04-07    TPro  6.6  /  Alb  3.3  /  TBili  0.8  /  DBili  x   /  AST  13  /  ALT  11  /  AlkPhos  45  04-07    PT/INR - ( 06 Apr 2021 03:14 )   PT: 15.9 sec;   INR: 1.34          PTT - ( 06 Apr 2021 03:14 )  PTT:38.0 sec  Cultures  Culture Results:   No growth at 1 day. (04-06 @ 10:42)  Culture Results:   No growth at 1 day. (04-06 @ 10:42)  Culture Results:   >100,000 CFU/ml Staphylococcus aureus  presumptive Methicillin resistant  Confirmation to follow within 24 hours  Result called to and read back byAdrian Gutierrez RN 04/07/2021 12:52:01 (04-06 @ 03:59)      A/P

## 2021-04-07 NOTE — PROGRESS NOTE ADULT - PROBLEM SELECTOR PLAN 2
Patient with SpO2 90% on RA on admission. Increased to 94% on 3L NC. Suspicion for PE on admission given PMHx and elevated d-dimer of 4007. CT PE negative for PE. Patient is anticoagulated on Eliquis bid at home.   - CT PE negative for PE with pulmonary edema and venous congestion  - No hx of CHF Patient with SpO2 90% on RA on admission. Increased to 94% on 3L NC. Suspicion for PE on admission given PMHx and elevated d-dimer of 4007. CT PE negative for PE. Patient is anticoagulated on Eliquis bid at home.   - CT PE negative for PE with pulmonary edema and venous congestion  - No hx of CHF  - f/u TTE Patient with SpO2 90% on RA on admission. Increased to 94% on 3L NC. Suspicion for PE on admission given PMHx and elevated d-dimer of 4007. CT PE negative for PE. Patient is anticoagulated on Eliquis bid at home. DDx for AHRF include 2/2 to aspiration vs atypical pna vs cardiac etiology  - CT PE negative for PE with pulmonary edema and venous congestion  - covid PCR negative x2  - f/u urine legionella ag   - f/u TTE  - c/w vanc/zosyn as above    # Metabolic encephalopathy  - at baseline AAOx3 presenting with AMS x 4 days. Metabolic encephalpathy likely 2/2 to UTI.  - c/w abx as above  - f/u CTH   - f/u TSH

## 2021-04-07 NOTE — DIETITIAN INITIAL EVALUATION ADULT. - REASON
deferred due to limited contact precautions r/t COVID-19 dx; per visual assessment, pt likely with at least mild PCM.

## 2021-04-08 LAB
ALBUMIN SERPL ELPH-MCNC: 3 G/DL — LOW (ref 3.3–5)
ALP SERPL-CCNC: 43 U/L — SIGNIFICANT CHANGE UP (ref 40–120)
ALT FLD-CCNC: 9 U/L — LOW (ref 10–45)
ANION GAP SERPL CALC-SCNC: 10 MMOL/L — SIGNIFICANT CHANGE UP (ref 5–17)
ANION GAP SERPL CALC-SCNC: 11 MMOL/L — SIGNIFICANT CHANGE UP (ref 5–17)
APTT BLD: 37 SEC — HIGH (ref 27.5–35.5)
AST SERPL-CCNC: 11 U/L — SIGNIFICANT CHANGE UP (ref 10–40)
BASOPHILS # BLD AUTO: 0.06 K/UL — SIGNIFICANT CHANGE UP (ref 0–0.2)
BASOPHILS NFR BLD AUTO: 0.7 % — SIGNIFICANT CHANGE UP (ref 0–2)
BILIRUB SERPL-MCNC: 0.9 MG/DL — SIGNIFICANT CHANGE UP (ref 0.2–1.2)
BUN SERPL-MCNC: 13 MG/DL — SIGNIFICANT CHANGE UP (ref 7–23)
BUN SERPL-MCNC: 13 MG/DL — SIGNIFICANT CHANGE UP (ref 7–23)
CALCIUM SERPL-MCNC: 9.5 MG/DL — SIGNIFICANT CHANGE UP (ref 8.4–10.5)
CALCIUM SERPL-MCNC: 9.9 MG/DL — SIGNIFICANT CHANGE UP (ref 8.4–10.5)
CHLORIDE SERPL-SCNC: 104 MMOL/L — SIGNIFICANT CHANGE UP (ref 96–108)
CHLORIDE SERPL-SCNC: 105 MMOL/L — SIGNIFICANT CHANGE UP (ref 96–108)
CO2 SERPL-SCNC: 24 MMOL/L — SIGNIFICANT CHANGE UP (ref 22–31)
CO2 SERPL-SCNC: 24 MMOL/L — SIGNIFICANT CHANGE UP (ref 22–31)
CREAT SERPL-MCNC: 0.41 MG/DL — LOW (ref 0.5–1.3)
CREAT SERPL-MCNC: 0.43 MG/DL — LOW (ref 0.5–1.3)
EOSINOPHIL # BLD AUTO: 0.45 K/UL — SIGNIFICANT CHANGE UP (ref 0–0.5)
EOSINOPHIL NFR BLD AUTO: 4.9 % — SIGNIFICANT CHANGE UP (ref 0–6)
GLUCOSE SERPL-MCNC: 121 MG/DL — HIGH (ref 70–99)
GLUCOSE SERPL-MCNC: 94 MG/DL — SIGNIFICANT CHANGE UP (ref 70–99)
HCT VFR BLD CALC: 35.8 % — SIGNIFICANT CHANGE UP (ref 34.5–45)
HGB BLD-MCNC: 11.7 G/DL — SIGNIFICANT CHANGE UP (ref 11.5–15.5)
IMM GRANULOCYTES NFR BLD AUTO: 0.3 % — SIGNIFICANT CHANGE UP (ref 0–1.5)
INR BLD: 1.5 — HIGH (ref 0.88–1.16)
LEGIONELLA AG UR QL: NEGATIVE — SIGNIFICANT CHANGE UP
LYMPHOCYTES # BLD AUTO: 1.66 K/UL — SIGNIFICANT CHANGE UP (ref 1–3.3)
LYMPHOCYTES # BLD AUTO: 18.2 % — SIGNIFICANT CHANGE UP (ref 13–44)
MAGNESIUM SERPL-MCNC: 1.7 MG/DL — SIGNIFICANT CHANGE UP (ref 1.6–2.6)
MAGNESIUM SERPL-MCNC: 1.7 MG/DL — SIGNIFICANT CHANGE UP (ref 1.6–2.6)
MCHC RBC-ENTMCNC: 29.7 PG — SIGNIFICANT CHANGE UP (ref 27–34)
MCHC RBC-ENTMCNC: 32.7 GM/DL — SIGNIFICANT CHANGE UP (ref 32–36)
MCV RBC AUTO: 90.9 FL — SIGNIFICANT CHANGE UP (ref 80–100)
MONOCYTES # BLD AUTO: 0.87 K/UL — SIGNIFICANT CHANGE UP (ref 0–0.9)
MONOCYTES NFR BLD AUTO: 9.6 % — SIGNIFICANT CHANGE UP (ref 2–14)
NEUTROPHILS # BLD AUTO: 6.03 K/UL — SIGNIFICANT CHANGE UP (ref 1.8–7.4)
NEUTROPHILS NFR BLD AUTO: 66.3 % — SIGNIFICANT CHANGE UP (ref 43–77)
NRBC # BLD: 0 /100 WBCS — SIGNIFICANT CHANGE UP (ref 0–0)
PHOSPHATE SERPL-MCNC: 1.7 MG/DL — LOW (ref 2.5–4.5)
PHOSPHATE SERPL-MCNC: 2.2 MG/DL — LOW (ref 2.5–4.5)
PLATELET # BLD AUTO: 207 K/UL — SIGNIFICANT CHANGE UP (ref 150–400)
POTASSIUM SERPL-MCNC: 3 MMOL/L — LOW (ref 3.5–5.3)
POTASSIUM SERPL-MCNC: 3.3 MMOL/L — LOW (ref 3.5–5.3)
POTASSIUM SERPL-SCNC: 3 MMOL/L — LOW (ref 3.5–5.3)
POTASSIUM SERPL-SCNC: 3.3 MMOL/L — LOW (ref 3.5–5.3)
PROT SERPL-MCNC: 6.1 G/DL — SIGNIFICANT CHANGE UP (ref 6–8.3)
PROTHROM AB SERPL-ACNC: 17.7 SEC — HIGH (ref 10.6–13.6)
RBC # BLD: 3.94 M/UL — SIGNIFICANT CHANGE UP (ref 3.8–5.2)
RBC # FLD: 13.2 % — SIGNIFICANT CHANGE UP (ref 10.3–14.5)
S PNEUM AG UR QL: NEGATIVE — SIGNIFICANT CHANGE UP
SODIUM SERPL-SCNC: 138 MMOL/L — SIGNIFICANT CHANGE UP (ref 135–145)
SODIUM SERPL-SCNC: 140 MMOL/L — SIGNIFICANT CHANGE UP (ref 135–145)
T PALLIDUM AB TITR SER: NEGATIVE — SIGNIFICANT CHANGE UP
VIT B12 SERPL-MCNC: 347 PG/ML — SIGNIFICANT CHANGE UP (ref 232–1245)
WBC # BLD: 9.1 K/UL — SIGNIFICANT CHANGE UP (ref 3.8–10.5)
WBC # FLD AUTO: 9.1 K/UL — SIGNIFICANT CHANGE UP (ref 3.8–10.5)

## 2021-04-08 PROCEDURE — 71045 X-RAY EXAM CHEST 1 VIEW: CPT | Mod: 26

## 2021-04-08 PROCEDURE — 52332 CYSTOSCOPY AND TREATMENT: CPT | Mod: LT

## 2021-04-08 RX ORDER — LEVOTHYROXINE SODIUM 125 MCG
50 TABLET ORAL DAILY
Refills: 0 | Status: DISCONTINUED | OUTPATIENT
Start: 2021-04-09 | End: 2021-04-09

## 2021-04-08 RX ORDER — MAGNESIUM SULFATE 500 MG/ML
1 VIAL (ML) INJECTION ONCE
Refills: 0 | Status: DISCONTINUED | OUTPATIENT
Start: 2021-04-08 | End: 2021-04-08

## 2021-04-08 RX ORDER — SENNA PLUS 8.6 MG/1
2 TABLET ORAL AT BEDTIME
Refills: 0 | Status: DISCONTINUED | OUTPATIENT
Start: 2021-04-08 | End: 2021-04-09

## 2021-04-08 RX ORDER — APIXABAN 2.5 MG/1
5 TABLET, FILM COATED ORAL EVERY 12 HOURS
Refills: 0 | Status: DISCONTINUED | OUTPATIENT
Start: 2021-04-08 | End: 2021-04-09

## 2021-04-08 RX ORDER — POTASSIUM PHOSPHATE, MONOBASIC POTASSIUM PHOSPHATE, DIBASIC 236; 224 MG/ML; MG/ML
30 INJECTION, SOLUTION INTRAVENOUS ONCE
Refills: 0 | Status: COMPLETED | OUTPATIENT
Start: 2021-04-08 | End: 2021-04-08

## 2021-04-08 RX ORDER — HYDROMORPHONE HYDROCHLORIDE 2 MG/ML
0.25 INJECTION INTRAMUSCULAR; INTRAVENOUS; SUBCUTANEOUS ONCE
Refills: 0 | Status: DISCONTINUED | OUTPATIENT
Start: 2021-04-08 | End: 2021-04-08

## 2021-04-08 RX ORDER — KETOROLAC TROMETHAMINE 30 MG/ML
15 SYRINGE (ML) INJECTION ONCE
Refills: 0 | Status: DISCONTINUED | OUTPATIENT
Start: 2021-04-08 | End: 2021-04-08

## 2021-04-08 RX ORDER — POLYETHYLENE GLYCOL 3350 17 G/17G
17 POWDER, FOR SOLUTION ORAL EVERY 12 HOURS
Refills: 0 | Status: DISCONTINUED | OUTPATIENT
Start: 2021-04-08 | End: 2021-04-09

## 2021-04-08 RX ORDER — VANCOMYCIN HCL 1 G
1000 VIAL (EA) INTRAVENOUS EVERY 24 HOURS
Refills: 0 | Status: DISCONTINUED | OUTPATIENT
Start: 2021-04-08 | End: 2021-04-09

## 2021-04-08 RX ORDER — ACETAMINOPHEN 500 MG
650 TABLET ORAL EVERY 6 HOURS
Refills: 0 | Status: DISCONTINUED | OUTPATIENT
Start: 2021-04-08 | End: 2021-04-09

## 2021-04-08 RX ORDER — PIPERACILLIN AND TAZOBACTAM 4; .5 G/20ML; G/20ML
3.38 INJECTION, POWDER, LYOPHILIZED, FOR SOLUTION INTRAVENOUS ONCE
Refills: 0 | Status: COMPLETED | OUTPATIENT
Start: 2021-04-08 | End: 2021-04-08

## 2021-04-08 RX ORDER — ACETAMINOPHEN 500 MG
1000 TABLET ORAL ONCE
Refills: 0 | Status: COMPLETED | OUTPATIENT
Start: 2021-04-08 | End: 2021-04-08

## 2021-04-08 RX ORDER — PIPERACILLIN AND TAZOBACTAM 4; .5 G/20ML; G/20ML
3.38 INJECTION, POWDER, LYOPHILIZED, FOR SOLUTION INTRAVENOUS EVERY 6 HOURS
Refills: 0 | Status: DISCONTINUED | OUTPATIENT
Start: 2021-04-08 | End: 2021-04-09

## 2021-04-08 RX ORDER — POTASSIUM PHOSPHATE, MONOBASIC POTASSIUM PHOSPHATE, DIBASIC 236; 224 MG/ML; MG/ML
30 INJECTION, SOLUTION INTRAVENOUS ONCE
Refills: 0 | Status: DISCONTINUED | OUTPATIENT
Start: 2021-04-08 | End: 2021-04-08

## 2021-04-08 RX ORDER — POTASSIUM CHLORIDE 20 MEQ
10 PACKET (EA) ORAL
Refills: 0 | Status: DISCONTINUED | OUTPATIENT
Start: 2021-04-08 | End: 2021-04-08

## 2021-04-08 RX ADMIN — Medication 1000 MILLIGRAM(S): at 16:46

## 2021-04-08 RX ADMIN — PIPERACILLIN AND TAZOBACTAM 200 GRAM(S): 4; .5 INJECTION, POWDER, LYOPHILIZED, FOR SOLUTION INTRAVENOUS at 19:31

## 2021-04-08 RX ADMIN — Medication 400 MILLIGRAM(S): at 15:46

## 2021-04-08 RX ADMIN — Medication 15 MILLIGRAM(S): at 17:50

## 2021-04-08 RX ADMIN — Medication 650 MILLIGRAM(S): at 05:50

## 2021-04-08 RX ADMIN — POLYETHYLENE GLYCOL 3350 17 GRAM(S): 17 POWDER, FOR SOLUTION ORAL at 06:20

## 2021-04-08 RX ADMIN — Medication 50 MICROGRAM(S): at 06:20

## 2021-04-08 RX ADMIN — APIXABAN 5 MILLIGRAM(S): 2.5 TABLET, FILM COATED ORAL at 17:37

## 2021-04-08 RX ADMIN — Medication 15 MILLIGRAM(S): at 09:05

## 2021-04-08 RX ADMIN — Medication 1 DROP(S): at 06:20

## 2021-04-08 RX ADMIN — Medication 15 MILLIGRAM(S): at 08:50

## 2021-04-08 RX ADMIN — PIPERACILLIN AND TAZOBACTAM 200 GRAM(S): 4; .5 INJECTION, POWDER, LYOPHILIZED, FOR SOLUTION INTRAVENOUS at 00:07

## 2021-04-08 RX ADMIN — Medication 1 DROP(S): at 00:08

## 2021-04-08 RX ADMIN — SENNA PLUS 2 TABLET(S): 8.6 TABLET ORAL at 21:18

## 2021-04-08 RX ADMIN — PANTOPRAZOLE SODIUM 40 MILLIGRAM(S): 20 TABLET, DELAYED RELEASE ORAL at 06:20

## 2021-04-08 RX ADMIN — PIPERACILLIN AND TAZOBACTAM 200 GRAM(S): 4; .5 INJECTION, POWDER, LYOPHILIZED, FOR SOLUTION INTRAVENOUS at 06:20

## 2021-04-08 RX ADMIN — Medication 250 MILLIGRAM(S): at 18:18

## 2021-04-08 RX ADMIN — POLYETHYLENE GLYCOL 3350 17 GRAM(S): 17 POWDER, FOR SOLUTION ORAL at 17:37

## 2021-04-08 RX ADMIN — Medication 650 MILLIGRAM(S): at 04:50

## 2021-04-08 RX ADMIN — Medication 100 MILLIEQUIVALENT(S): at 11:07

## 2021-04-08 RX ADMIN — POTASSIUM PHOSPHATE, MONOBASIC POTASSIUM PHOSPHATE, DIBASIC 83.33 MILLIMOLE(S): 236; 224 INJECTION, SOLUTION INTRAVENOUS at 21:18

## 2021-04-08 RX ADMIN — Medication 15 MILLIGRAM(S): at 17:37

## 2021-04-08 NOTE — PROGRESS NOTE ADULT - PROBLEM SELECTOR PLAN 10
F: s/p 1L NS, will get 1 more L NS  E: Replete PRN  N: Regular   GI PPx: Protonix 40 qd  DVT PPx: Eliquis 5mg bid  Dispo: [facility] F: none  E: Replete PRN  N: Regular   GI PPx: Protonix 40 qd  DVT PPx: holding i/s/o stent procedure  Dispo: [facility]

## 2021-04-08 NOTE — PROGRESS NOTE ADULT - PROBLEM SELECTOR PLAN 1
Patient with 5 days of increasing confusion and fever. On admission, noted to have Tmax 102, , WBC 15, and RR 26 with positive UA and lactate 3.0. Patient with chronic suprapubic catheter in place, last changed last week, and hx of multiple UTIs. Likely severe sepsis 2/2 UTI.   - Suprapubic cath in place, not clogged, changed last week and again by urology on 4/7   - c/w vanc/zosyn for MRSA UTI and concern for aspiration PNA  - Ucx + for MRSA  - CT AP stone hunt: Moderate left hydronephrosis with proximal to mid 10 mm ureteral stone. Additional punctate nonobstructing bilateral renal stones.  - BCx NGTD Patient with 5 days of increasing confusion and fever. On admission, noted to have Tmax 102, , WBC 15, and RR 26 with positive UA and lactate 3.0. Patient with chronic suprapubic catheter in place, last changed last week, and hx of multiple UTIs. Likely severe sepsis 2/2 UTI.   - Suprapubic cath in place, not clogged, changed last week and again by urology on 4/7   - c/w vanc/zosyn for MRSA UTI and concern for aspiration PNA  - Ucx + for MRSA  - CT AP stone hunt: Moderate left hydronephrosis with proximal to mid 10 mm ureteral stone. Additional punctate nonobstructing bilateral renal stones.  - plan for ureteral stent by urology on 4/8  - BCx NGTD

## 2021-04-08 NOTE — PROGRESS NOTE ADULT - SUBJECTIVE AND OBJECTIVE BOX
SUBJECTIVE: Denies suprapubic/flank pain or fevers/chills       OBJECTIVE:    Vital Signs:  Vital Signs Last 24 Hrs  T(C): 36.8 (08 Apr 2021 06:46), Max: 36.9 (07 Apr 2021 21:33)  T(F): 98.2 (08 Apr 2021 06:46), Max: 98.4 (07 Apr 2021 21:33)  HR: 79 (08 Apr 2021 06:46) (60 - 80)  BP: 114/74 (08 Apr 2021 06:46) (113/58 - 137/78)  BP(mean): 72 (07 Apr 2021 13:44) (72 - 81)  RR: 18 (08 Apr 2021 06:46) (16 - 18)  SpO2: 94% (08 Apr 2021 06:46) (91% - 98%)    Physical Exam:  General: NAD  Pulmonary: Nonlabored breathing, no respiratory distress  Abdominal: No suprapubic tenderness  : Suprapubic tube draining clear yellow urine; No CVA tenderness bilaterally     Ins and Outs:  I&O's Summary    07 Apr 2021 07:01  -  08 Apr 2021 07:00  --------------------------------------------------------  IN: 0 mL / OUT: 1200 mL / NET: -1200 mL        Labs:                        11.9   12.75 )-----------( 203      ( 07 Apr 2021 10:08 )             36.7     04-07    140  |  105  |  14  ----------------------------<  136<H>  3.7   |  25  |  0.38<L>    Ca    10.2      07 Apr 2021 10:08  Phos  1.9     04-07  Mg     1.9     04-07    TPro  6.6  /  Alb  3.3  /  TBili  0.8  /  DBili  x   /  AST  13  /  ALT  11  /  AlkPhos  45  04-07        CAPILLARY BLOOD GLUCOSE        LIVER FUNCTIONS - ( 07 Apr 2021 10:08 )  Alb: 3.3 g/dL / Pro: 6.6 g/dL / ALK PHOS: 45 U/L / ALT: 11 U/L / AST: 13 U/L / GGT: x

## 2021-04-08 NOTE — PROGRESS NOTE ADULT - PROBLEM SELECTOR PLAN 4
Patient presents with Ca of 11.2. No protein gap.  - s/p 1.5L NS  - PTH, PTHrP, Vit D Patient presents with Ca of 11.2. No protein gap. s/p 1.5L NS  - intact PTH 71 (elevated), vitamin D-25 29.3 (low), vit D-1,25 wnl  - cCa 10.7 (normal range 8.4-10.5)  - monitor for any altered mentation, constipation, lethargy

## 2021-04-08 NOTE — PROGRESS NOTE ADULT - PROBLEM SELECTOR PLAN 3
Patient reports no BM in 4 days. Taking Linzess and miralax bid at home but struggles to pass BM d/t neurogenic issues.   - Started on bowel regimen: Miralax bid, senna 2 tab qhs, lactulose 20mL, dulcolax suppository, tap water enema  - patient had BM 4/7 s/p enema

## 2021-04-08 NOTE — PROGRESS NOTE ADULT - PROBLEM SELECTOR PLAN 8
Patient with hx of anxiety. On home valium 10mg PRN and mirtazapine 15mg qhs.   - Will hold home meds now in the setting of somnolence and confusion.   - Will consider restarting mirtazapine when patient more alert
Patient with hx of anxiety. On home valium 10mg PRN and mirtazapine 15mg qhs.   - Will hold home meds now in the setting of somnolence and confusion.   - Will consider restarting mirtazapine when patient more alert

## 2021-04-08 NOTE — PROGRESS NOTE ADULT - ASSESSMENT
75 y/o Female with PMHx PE (2016 on Eliquis), CIDP, constipation, chronic UTIs w/ suprapubic catheter (last changed 1 week ago), neurogenic bladder, GERD, hypothyroidism, dropped foot, broken hip fracture in 2016 (not repaired), shoulder injury (repaired 2016), and rotator cuff tear w/ 24 hour healthcare d/t injuries presents for cough, fever, and increased confusion noted by HHA admitted for hypoxic respiratory failure and for severe sepsis 2/2 UTI from ureteral stone and possibly aspiration PNA.

## 2021-04-08 NOTE — PROGRESS NOTE ADULT - ASSESSMENT
Assessment: 76F PMH PE (2016 on Eliquis), CIDP, constipation, GERD, hypothyroidism, dropped foot, broken hip fracture in 2016 (not repaired), shoulder injury (repaired 2016), rotator cuff tear, neurogenic bladder c/b chronic UTIs w/ suprapubic catheter (changed 1 week before admission, most recently changed on admission), admitted with cough, fever, and increased confusion noted by HHA admitted for hypoxic respiratory failure and for severe sepsis 2/2 UTI. The patient has remained afebrile and hemodynamically stable since admission from the ER. She has had a slight leukocytosis with urine culture 4/06/21 growing MRSA, but blood cultures 4/06/21 showing no growth. The patient has not had any flank/abdominal tenderness, but CT A/P 4/07/21 showing moderate left hydronephrosis with a proximal to mid ureteral 10 mm calculus.    Recommendations: Assessment: 76F PMH PE (2016 on Eliquis), CIDP, constipation, GERD, hypothyroidism, dropped foot, broken hip fracture in 2016 (not repaired), shoulder injury (repaired 2016), rotator cuff tear, neurogenic bladder c/b chronic UTIs w/ suprapubic catheter (changed 1 week before admission, most recently changed on admission), admitted with cough, fever, and increased confusion noted by HHA admitted for hypoxic respiratory failure and for severe sepsis 2/2 UTI. The patient has remained afebrile and hemodynamically stable since admission from the ER. She has had a slight leukocytosis with urine culture 4/06/21 growing MRSA, but blood cultures 4/06/21 showing no growth. The patient has not had any flank/abdominal tenderness, but CT A/P 4/07/21 showing moderate left hydronephrosis with a proximal to mid ureteral 10 mm calculus.    Recommendations:  - Added on left ureteral stent for today  - Continue NPO until surgery  - Preoperative CBC/BMP/coags/EKG/CXR  - Medical optimization for surgery Assessment: 76F PMH PE (2016 on Eliquis), CIDP, constipation, GERD, hypothyroidism, dropped foot, broken hip fracture in 2016 (not repaired), shoulder injury (repaired 2016), rotator cuff tear, neurogenic bladder c/b chronic UTIs w/ suprapubic catheter (changed 1 week before admission, most recently changed on admission), admitted with cough, fever, and increased confusion noted by HHA admitted for hypoxic respiratory failure and for severe sepsis 2/2 UTI. The patient has remained afebrile and hemodynamically stable since admission from the ER. She has had a slight leukocytosis with urine culture 4/06/21 growing MRSA, but blood cultures 4/06/21 showing no growth. The patient has flank/back pain, CT A/P 4/07/21 showing moderate left hydronephrosis with a proximal to mid ureteral 10 mm calculus.    Recommendations:  - Added on left ureteral stent for today  - Continue NPO until surgery  - Preoperative CBC/BMP/coags/EKG/CXR  - Medical optimization for surgery

## 2021-04-08 NOTE — PROGRESS NOTE ADULT - PROBLEM SELECTOR PLAN 6
Patient with hx of hypothyroid on levothyroxine at home.   - C/w levothyroxine 50mcg qd Patient with hx of hypothyroid on levothyroxine at home.   - c/w levothyroxine 50mcg qd

## 2021-04-08 NOTE — PROGRESS NOTE ADULT - PROBLEM SELECTOR PLAN 2
Patient with SpO2 90% on RA on admission. Increased to 94% on 3L NC. Suspicion for PE on admission given PMHx and elevated d-dimer of 4007. CT PE negative for PE. Patient is anticoagulated on Eliquis bid at home. DDx for AHRF include 2/2 to aspiration vs atypical pna vs cardiac etiology  - CT PE negative for PE with pulmonary edema and venous congestion  - covid PCR negative x2  - urine legionella ag and strep pneumo negative   - TTE: LVEF 60-65%, no pHTN  - c/w vanc/zosyn as above  - currently satting 94% on room air    # Metabolic encephalopathy  - at baseline AAOx3 presenting with AMS x 4 days. Metabolic encephalpathy likely 2/2 to UTI.  - c/w abx as above  - f/u CTH   - f/u TSH Patient with SpO2 90% on RA on admission. Increased to 94% on 3L NC. Suspicion for PE on admission given PMHx and elevated d-dimer of 4007. CT PE negative for PE. Patient is anticoagulated on Eliquis bid at home. DDx for AHRF include 2/2 to aspiration vs atypical pna vs cardiac etiology  - CT PE negative for PE with pulmonary edema and venous congestion  - covid PCR negative x2  - urine legionella ag and strep pneumo negative   - TTE: LVEF 60-65%, no pHTN  - c/w vanc/zosyn as above  - currently satting 94% on room air    # Metabolic encephalopathy  - at baseline AAOx3 presenting with AMS x 4 days. Metabolic encephalopathy likely 2/2 to UTI.  - c/w abx as above  - CTH negative  - TSH wnl

## 2021-04-08 NOTE — PROGRESS NOTE ADULT - PROBLEM SELECTOR PLAN 9
Patient with hx of polyneuropathy. On suprapubic cath for neurogenic bladder.   - C/w suprapubic cath- changed 1/month, last changed last week Patient with hx of polyneuropathy. On suprapubic cath for neurogenic bladder.   - C/w suprapubic cath- changed 1/month, last changed last week and also by urology on 4/7

## 2021-04-08 NOTE — PROGRESS NOTE ADULT - PROBLEM SELECTOR PLAN 5
Patient with hx of PE in 2016, managed with medication. On Eliquis at home.  - C/w Eliquis 5mg BID Patient with hx of PE in 2016, managed with medication. On Eliquis at home.  - will need to clarify with PCP if patient still takes medication

## 2021-04-08 NOTE — PROGRESS NOTE ADULT - PROBLEM SELECTOR PLAN 7
Patient with hx of hip fracture in 2016 but was not good candidate for surgery. Never had treated.   - Tylenol PRN for pain
Patient with hx of hip fracture in 2016 but was not good candidate for surgery. Never had treated.   - Tylenol PRN for pain

## 2021-04-08 NOTE — PROGRESS NOTE ADULT - SUBJECTIVE AND OBJECTIVE BOX
*INCOMPLETE NOTE*    INTERVAL HPI/OVERNIGHT EVENTS:  As per night team, no overnight events. Patient seen and examined at bedside. Patient denies fever, chills, dizziness, weakness, HA, CP, SOB, N/V/D/C. 12 pt ROS otherwise negative.     VITALS  Vital Signs Last 24 Hrs  T(C): 36.4 (08 Apr 2021 12:24), Max: 36.9 (07 Apr 2021 21:33)  T(F): 97.5 (08 Apr 2021 12:24), Max: 98.4 (07 Apr 2021 21:33)  HR: 71 (08 Apr 2021 12:24) (71 - 80)  BP: 117/75 (08 Apr 2021 12:24) (114/74 - 137/78)  BP(mean): --  RR: 18 (08 Apr 2021 12:24) (18 - 18)  SpO2: 94% (08 Apr 2021 12:24) (91% - 94%)    CAPILLARY BLOOD GLUCOSE          PHYSICAL EXAM  General: NAD, sitting comfortably in bed   HEENT: PERRL/ EOMI, no scleral icterus, MMM  Neck: Supple, no JVD  Respiratory: lungs CTA b/l, no wheezes/crackles, no accessory muscle use  Cardiovascular: Regular rhythm/rate; +S1 +S2, no murmurs  Gastrointestinal: Soft, NTND, normoactive BS, no rebound, no guarding  Genitourinary: no suprapubic tenderness  Extremities: WWP, no cyanosis, no clubbing, no edema, pulses equal  Neurological: A&Ox3, CNII-XII grossly intact, no gross focal deficits, follows commands  Skin: Normal temperature, warm, dry    MEDICATIONS  (STANDING):  magnesium sulfate  IVPB 1 Gram(s) IV Intermittent once  potassium chloride  10 mEq/100 mL IVPB 10 milliEquivalent(s) IV Intermittent every 1 hour  potassium phosphate IVPB 30 milliMole(s) IV Intermittent once    MEDICATIONS  (PRN):      angiotensin converting enzyme inhibitors (Unknown)  IV Contrast (Unknown)  nitrofurantoin (Unknown)      LABS                        11.7   9.10  )-----------( 207      ( 08 Apr 2021 08:41 )             35.8     04-08    140  |  105  |  13  ----------------------------<  94  3.0<L>   |  24  |  0.43<L>    Ca    9.9      08 Apr 2021 08:41  Phos  1.7     04-08  Mg     1.7     04-08    TPro  6.1  /  Alb  3.0<L>  /  TBili  0.9  /  DBili  x   /  AST  11  /  ALT  9<L>  /  AlkPhos  43  04-08    PT/INR - ( 08 Apr 2021 08:41 )   PT: 17.7 sec;   INR: 1.50          PTT - ( 08 Apr 2021 08:41 )  PTT:37.0 sec          RADIOLOGY & ADDITIONAL TESTS: Reviewed INTERVAL HPI/OVERNIGHT EVENTS:  Lateral transfer from [address] [place] [address] after negative COVID swabs. Per night team, complaining of persistent back pain saying that tylenol is not alleviating discomfort. Rates pain at 8/10 and nonspecific location. Explained to patient she is scheduled for ureteral stent procedure given abnormal CT finding and her sepsis presentation. Patient denies fever, chills, HA, CP, SOB, abdominal pain. 12 pt ROS otherwise negative.     VITALS  Vital Signs Last 24 Hrs  T(C): 36.4 (08 Apr 2021 12:24), Max: 36.9 (07 Apr 2021 21:33)  T(F): 97.5 (08 Apr 2021 12:24), Max: 98.4 (07 Apr 2021 21:33)  HR: 71 (08 Apr 2021 12:24) (71 - 80)  BP: 117/75 (08 Apr 2021 12:24) (114/74 - 137/78)  BP(mean): --  RR: 18 (08 Apr 2021 12:24) (18 - 18)  SpO2: 94% (08 Apr 2021 12:24) (91% - 94%)    CAPILLARY BLOOD GLUCOSE          PHYSICAL EXAM  General: elderly woman, moaning in pain in bed  HEENT: PERRL/ EOMI, no scleral icterus, MMM  Neck: Supple, no JVD  Respiratory: lungs CTA b/l, no wheezes/crackles, no accessory muscle use  Cardiovascular: Regular rhythm/rate; +S1 +S2, no murmurs  Gastrointestinal: Soft, NTND, normoactive BS, no rebound, no guarding  Genitourinary: suprapubic catheter in place, draining dark yellow urine  Musculoskeletal: nonspecific back tenderness throughout  Extremities: WWP, no cyanosis, no clubbing, no edema, pulses equal  Neurological: A&Ox3, no gross focal deficits, follows commands  Skin: Normal temperature, warm, dry    MEDICATIONS  (STANDING):  magnesium sulfate  IVPB 1 Gram(s) IV Intermittent once  potassium chloride  10 mEq/100 mL IVPB 10 milliEquivalent(s) IV Intermittent every 1 hour  potassium phosphate IVPB 30 milliMole(s) IV Intermittent once    MEDICATIONS  (PRN):      angiotensin converting enzyme inhibitors (Unknown)  IV Contrast (Unknown)  nitrofurantoin (Unknown)      LABS                        11.7   9.10  )-----------( 207      ( 08 Apr 2021 08:41 )             35.8     04-08    140  |  105  |  13  ----------------------------<  94  3.0<L>   |  24  |  0.43<L>    Ca    9.9      08 Apr 2021 08:41  Phos  1.7     04-08  Mg     1.7     04-08    TPro  6.1  /  Alb  3.0<L>  /  TBili  0.9  /  DBili  x   /  AST  11  /  ALT  9<L>  /  AlkPhos  43  04-08    PT/INR - ( 08 Apr 2021 08:41 )   PT: 17.7 sec;   INR: 1.50          PTT - ( 08 Apr 2021 08:41 )  PTT:37.0 sec          RADIOLOGY & ADDITIONAL TESTS: Reviewed

## 2021-04-09 ENCOUNTER — TRANSCRIPTION ENCOUNTER (OUTPATIENT)
Age: 76
End: 2021-04-09

## 2021-04-09 VITALS
OXYGEN SATURATION: 91 % | RESPIRATION RATE: 17 BRPM | DIASTOLIC BLOOD PRESSURE: 74 MMHG | SYSTOLIC BLOOD PRESSURE: 118 MMHG | TEMPERATURE: 98 F | HEART RATE: 79 BPM

## 2021-04-09 PROBLEM — I26.99 OTHER PULMONARY EMBOLISM WITHOUT ACUTE COR PULMONALE: Chronic | Status: ACTIVE | Noted: 2021-04-06

## 2021-04-09 PROBLEM — E03.9 HYPOTHYROIDISM, UNSPECIFIED: Chronic | Status: ACTIVE | Noted: 2021-04-06

## 2021-04-09 PROBLEM — G62.9 POLYNEUROPATHY, UNSPECIFIED: Chronic | Status: ACTIVE | Noted: 2021-04-06

## 2021-04-09 PROBLEM — S72.009A FRACTURE OF UNSPECIFIED PART OF NECK OF UNSPECIFIED FEMUR, INITIAL ENCOUNTER FOR CLOSED FRACTURE: Chronic | Status: ACTIVE | Noted: 2021-04-06

## 2021-04-09 LAB
-  CEFAZOLIN: SIGNIFICANT CHANGE UP
-  CEFAZOLIN: SIGNIFICANT CHANGE UP
-  LINEZOLID: SIGNIFICANT CHANGE UP
-  LINEZOLID: SIGNIFICANT CHANGE UP
-  OXACILLIN: SIGNIFICANT CHANGE UP
-  OXACILLIN: SIGNIFICANT CHANGE UP
-  RIFAMPIN: SIGNIFICANT CHANGE UP
-  RIFAMPIN: SIGNIFICANT CHANGE UP
-  TRIMETHOPRIM/SULFAMETHOXAZOLE: SIGNIFICANT CHANGE UP
-  TRIMETHOPRIM/SULFAMETHOXAZOLE: SIGNIFICANT CHANGE UP
-  VANCOMYCIN: SIGNIFICANT CHANGE UP
-  VANCOMYCIN: SIGNIFICANT CHANGE UP
ALBUMIN SERPL ELPH-MCNC: 2.9 G/DL — LOW (ref 3.3–5)
ALP SERPL-CCNC: 38 U/L — LOW (ref 40–120)
ALT FLD-CCNC: 8 U/L — LOW (ref 10–45)
ANION GAP SERPL CALC-SCNC: 11 MMOL/L — SIGNIFICANT CHANGE UP (ref 5–17)
APTT BLD: 36.8 SEC — HIGH (ref 27.5–35.5)
AST SERPL-CCNC: 12 U/L — SIGNIFICANT CHANGE UP (ref 10–40)
BASOPHILS # BLD AUTO: 0.01 K/UL — SIGNIFICANT CHANGE UP (ref 0–0.2)
BASOPHILS NFR BLD AUTO: 0.1 % — SIGNIFICANT CHANGE UP (ref 0–2)
BILIRUB SERPL-MCNC: 0.6 MG/DL — SIGNIFICANT CHANGE UP (ref 0.2–1.2)
BUN SERPL-MCNC: 11 MG/DL — SIGNIFICANT CHANGE UP (ref 7–23)
CALCIUM SERPL-MCNC: 9.2 MG/DL — SIGNIFICANT CHANGE UP (ref 8.4–10.5)
CHLORIDE SERPL-SCNC: 103 MMOL/L — SIGNIFICANT CHANGE UP (ref 96–108)
CO2 SERPL-SCNC: 22 MMOL/L — SIGNIFICANT CHANGE UP (ref 22–31)
CREAT SERPL-MCNC: 0.4 MG/DL — LOW (ref 0.5–1.3)
CULTURE RESULTS: SIGNIFICANT CHANGE UP
EOSINOPHIL # BLD AUTO: 0 K/UL — SIGNIFICANT CHANGE UP (ref 0–0.5)
EOSINOPHIL NFR BLD AUTO: 0 % — SIGNIFICANT CHANGE UP (ref 0–6)
GLUCOSE SERPL-MCNC: 148 MG/DL — HIGH (ref 70–99)
HCT VFR BLD CALC: 32.4 % — LOW (ref 34.5–45)
HGB BLD-MCNC: 10.7 G/DL — LOW (ref 11.5–15.5)
IMM GRANULOCYTES NFR BLD AUTO: 0.7 % — SIGNIFICANT CHANGE UP (ref 0–1.5)
INR BLD: 1.47 — HIGH (ref 0.88–1.16)
LYMPHOCYTES # BLD AUTO: 0.72 K/UL — LOW (ref 1–3.3)
LYMPHOCYTES # BLD AUTO: 10.2 % — LOW (ref 13–44)
MAGNESIUM SERPL-MCNC: 1.7 MG/DL — SIGNIFICANT CHANGE UP (ref 1.6–2.6)
MCHC RBC-ENTMCNC: 29.4 PG — SIGNIFICANT CHANGE UP (ref 27–34)
MCHC RBC-ENTMCNC: 33 GM/DL — SIGNIFICANT CHANGE UP (ref 32–36)
MCV RBC AUTO: 89 FL — SIGNIFICANT CHANGE UP (ref 80–100)
METHOD TYPE: SIGNIFICANT CHANGE UP
MONOCYTES # BLD AUTO: 0.43 K/UL — SIGNIFICANT CHANGE UP (ref 0–0.9)
MONOCYTES NFR BLD AUTO: 6.1 % — SIGNIFICANT CHANGE UP (ref 2–14)
NEUTROPHILS # BLD AUTO: 5.86 K/UL — SIGNIFICANT CHANGE UP (ref 1.8–7.4)
NEUTROPHILS NFR BLD AUTO: 82.9 % — HIGH (ref 43–77)
NRBC # BLD: 0 /100 WBCS — SIGNIFICANT CHANGE UP (ref 0–0)
ORGANISM # SPEC MICROSCOPIC CNT: SIGNIFICANT CHANGE UP
PHOSPHATE SERPL-MCNC: 3.6 MG/DL — SIGNIFICANT CHANGE UP (ref 2.5–4.5)
PLATELET # BLD AUTO: 217 K/UL — SIGNIFICANT CHANGE UP (ref 150–400)
POTASSIUM SERPL-MCNC: 3.9 MMOL/L — SIGNIFICANT CHANGE UP (ref 3.5–5.3)
POTASSIUM SERPL-SCNC: 3.9 MMOL/L — SIGNIFICANT CHANGE UP (ref 3.5–5.3)
PROT SERPL-MCNC: 5.9 G/DL — LOW (ref 6–8.3)
PROTHROM AB SERPL-ACNC: 17.3 SEC — HIGH (ref 10.6–13.6)
RBC # BLD: 3.64 M/UL — LOW (ref 3.8–5.2)
RBC # FLD: 13.4 % — SIGNIFICANT CHANGE UP (ref 10.3–14.5)
SODIUM SERPL-SCNC: 136 MMOL/L — SIGNIFICANT CHANGE UP (ref 135–145)
SPECIMEN SOURCE: SIGNIFICANT CHANGE UP
SPECIMEN SOURCE: SIGNIFICANT CHANGE UP
WBC # BLD: 7.07 K/UL — SIGNIFICANT CHANGE UP (ref 3.8–10.5)
WBC # FLD AUTO: 7.07 K/UL — SIGNIFICANT CHANGE UP (ref 3.8–10.5)

## 2021-04-09 PROCEDURE — 85025 COMPLETE CBC W/AUTO DIFF WBC: CPT

## 2021-04-09 PROCEDURE — 80053 COMPREHEN METABOLIC PANEL: CPT

## 2021-04-09 PROCEDURE — 82607 VITAMIN B-12: CPT

## 2021-04-09 PROCEDURE — 85610 PROTHROMBIN TIME: CPT

## 2021-04-09 PROCEDURE — 83970 ASSAY OF PARATHORMONE: CPT

## 2021-04-09 PROCEDURE — 82310 ASSAY OF CALCIUM: CPT

## 2021-04-09 PROCEDURE — 99285 EMERGENCY DEPT VISIT HI MDM: CPT

## 2021-04-09 PROCEDURE — 87449 NOS EACH ORGANISM AG IA: CPT

## 2021-04-09 PROCEDURE — 81001 URINALYSIS AUTO W/SCOPE: CPT

## 2021-04-09 PROCEDURE — 70450 CT HEAD/BRAIN W/O DYE: CPT

## 2021-04-09 PROCEDURE — 83605 ASSAY OF LACTIC ACID: CPT

## 2021-04-09 PROCEDURE — 83880 ASSAY OF NATRIURETIC PEPTIDE: CPT

## 2021-04-09 PROCEDURE — 93970 EXTREMITY STUDY: CPT

## 2021-04-09 PROCEDURE — 94640 AIRWAY INHALATION TREATMENT: CPT

## 2021-04-09 PROCEDURE — U0005: CPT

## 2021-04-09 PROCEDURE — 71045 X-RAY EXAM CHEST 1 VIEW: CPT

## 2021-04-09 PROCEDURE — 82962 GLUCOSE BLOOD TEST: CPT

## 2021-04-09 PROCEDURE — 86803 HEPATITIS C AB TEST: CPT

## 2021-04-09 PROCEDURE — C2617: CPT

## 2021-04-09 PROCEDURE — 82652 VIT D 1 25-DIHYDROXY: CPT

## 2021-04-09 PROCEDURE — 85730 THROMBOPLASTIN TIME PARTIAL: CPT

## 2021-04-09 PROCEDURE — 87181 SC STD AGAR DILUTION PER AGT: CPT

## 2021-04-09 PROCEDURE — 82330 ASSAY OF CALCIUM: CPT

## 2021-04-09 PROCEDURE — C1758: CPT

## 2021-04-09 PROCEDURE — 84132 ASSAY OF SERUM POTASSIUM: CPT

## 2021-04-09 PROCEDURE — 80048 BASIC METABOLIC PNL TOTAL CA: CPT

## 2021-04-09 PROCEDURE — 86140 C-REACTIVE PROTEIN: CPT

## 2021-04-09 PROCEDURE — 84100 ASSAY OF PHOSPHORUS: CPT

## 2021-04-09 PROCEDURE — 84295 ASSAY OF SERUM SODIUM: CPT

## 2021-04-09 PROCEDURE — 86769 SARS-COV-2 COVID-19 ANTIBODY: CPT

## 2021-04-09 PROCEDURE — 71275 CT ANGIOGRAPHY CHEST: CPT

## 2021-04-09 PROCEDURE — 82803 BLOOD GASES ANY COMBINATION: CPT

## 2021-04-09 PROCEDURE — 76000 FLUOROSCOPY <1 HR PHYS/QHP: CPT

## 2021-04-09 PROCEDURE — 83519 RIA NONANTIBODY: CPT

## 2021-04-09 PROCEDURE — 85379 FIBRIN DEGRADATION QUANT: CPT

## 2021-04-09 PROCEDURE — 87086 URINE CULTURE/COLONY COUNT: CPT

## 2021-04-09 PROCEDURE — 74176 CT ABD & PELVIS W/O CONTRAST: CPT

## 2021-04-09 PROCEDURE — 82728 ASSAY OF FERRITIN: CPT

## 2021-04-09 PROCEDURE — 84443 ASSAY THYROID STIM HORMONE: CPT

## 2021-04-09 PROCEDURE — 92526 ORAL FUNCTION THERAPY: CPT

## 2021-04-09 PROCEDURE — 99232 SBSQ HOSP IP/OBS MODERATE 35: CPT

## 2021-04-09 PROCEDURE — 83735 ASSAY OF MAGNESIUM: CPT

## 2021-04-09 PROCEDURE — 84145 PROCALCITONIN (PCT): CPT

## 2021-04-09 PROCEDURE — 93306 TTE W/DOPPLER COMPLETE: CPT

## 2021-04-09 PROCEDURE — 82306 VITAMIN D 25 HYDROXY: CPT

## 2021-04-09 PROCEDURE — 87040 BLOOD CULTURE FOR BACTERIA: CPT

## 2021-04-09 PROCEDURE — 36415 COLL VENOUS BLD VENIPUNCTURE: CPT

## 2021-04-09 PROCEDURE — 87899 AGENT NOS ASSAY W/OPTIC: CPT

## 2021-04-09 PROCEDURE — 87186 SC STD MICRODIL/AGAR DIL: CPT

## 2021-04-09 PROCEDURE — U0003: CPT

## 2021-04-09 PROCEDURE — 86780 TREPONEMA PALLIDUM: CPT

## 2021-04-09 RX ORDER — POLYETHYLENE GLYCOL 3350 17 G/17G
17 POWDER, FOR SOLUTION ORAL
Qty: 1020 | Refills: 0
Start: 2021-04-09 | End: 2021-05-08

## 2021-04-09 RX ORDER — SENNA PLUS 8.6 MG/1
2 TABLET ORAL
Qty: 60 | Refills: 0
Start: 2021-04-09 | End: 2021-05-08

## 2021-04-09 RX ORDER — TRAMADOL HYDROCHLORIDE 50 MG/1
1 TABLET ORAL
Qty: 10 | Refills: 0
Start: 2021-04-09 | End: 2021-04-13

## 2021-04-09 RX ORDER — TRAMADOL HYDROCHLORIDE 50 MG/1
25 TABLET ORAL EVERY 8 HOURS
Refills: 0 | Status: DISCONTINUED | OUTPATIENT
Start: 2021-04-09 | End: 2021-04-09

## 2021-04-09 RX ORDER — KETOROLAC TROMETHAMINE 30 MG/ML
15 SYRINGE (ML) INJECTION ONCE
Refills: 0 | Status: DISCONTINUED | OUTPATIENT
Start: 2021-04-09 | End: 2021-04-09

## 2021-04-09 RX ORDER — KETOROLAC TROMETHAMINE 30 MG/ML
15 SYRINGE (ML) INJECTION EVERY 8 HOURS
Refills: 0 | Status: DISCONTINUED | OUTPATIENT
Start: 2021-04-09 | End: 2021-04-09

## 2021-04-09 RX ADMIN — Medication 1 TABLET(S): at 17:27

## 2021-04-09 RX ADMIN — Medication 15 MILLIGRAM(S): at 14:02

## 2021-04-09 RX ADMIN — TRAMADOL HYDROCHLORIDE 25 MILLIGRAM(S): 50 TABLET ORAL at 13:48

## 2021-04-09 RX ADMIN — Medication 50 MICROGRAM(S): at 06:22

## 2021-04-09 RX ADMIN — APIXABAN 5 MILLIGRAM(S): 2.5 TABLET, FILM COATED ORAL at 17:27

## 2021-04-09 RX ADMIN — TRAMADOL HYDROCHLORIDE 25 MILLIGRAM(S): 50 TABLET ORAL at 13:40

## 2021-04-09 RX ADMIN — Medication 650 MILLIGRAM(S): at 03:45

## 2021-04-09 RX ADMIN — Medication 650 MILLIGRAM(S): at 19:39

## 2021-04-09 RX ADMIN — APIXABAN 5 MILLIGRAM(S): 2.5 TABLET, FILM COATED ORAL at 06:21

## 2021-04-09 RX ADMIN — Medication 15 MILLIGRAM(S): at 14:01

## 2021-04-09 RX ADMIN — PIPERACILLIN AND TAZOBACTAM 200 GRAM(S): 4; .5 INJECTION, POWDER, LYOPHILIZED, FOR SOLUTION INTRAVENOUS at 01:04

## 2021-04-09 RX ADMIN — POLYETHYLENE GLYCOL 3350 17 GRAM(S): 17 POWDER, FOR SOLUTION ORAL at 06:22

## 2021-04-09 RX ADMIN — PIPERACILLIN AND TAZOBACTAM 200 GRAM(S): 4; .5 INJECTION, POWDER, LYOPHILIZED, FOR SOLUTION INTRAVENOUS at 06:22

## 2021-04-09 RX ADMIN — Medication 650 MILLIGRAM(S): at 01:58

## 2021-04-09 RX ADMIN — POLYETHYLENE GLYCOL 3350 17 GRAM(S): 17 POWDER, FOR SOLUTION ORAL at 17:27

## 2021-04-09 RX ADMIN — Medication 15 MILLIGRAM(S): at 12:22

## 2021-04-09 RX ADMIN — Medication 15 MILLIGRAM(S): at 11:39

## 2021-04-09 NOTE — DISCHARGE NOTE PROVIDER - NSDCFUADDAPPT_GEN_ALL_CORE_FT
1. You had a stent placed in your ureter due to a complicated kidney stone infection. You need to see Dr. Zapata from urology on April 23rd at 11:30am to discuss further treatment options to address the kidney stone. The office can be reached at [phone].     2. Please see Dr. Nabil Marks, your primary care doctor, within 1-2 weeks of discharge to discuss your recent hospitalization. You should also inquire about the need to continue your eliquis medication for pulmonary embolism. The office can be reached at [phone].

## 2021-04-09 NOTE — DISCHARGE NOTE NURSING/CASE MANAGEMENT/SOCIAL WORK - NSDCFUADDAPPT_GEN_ALL_CORE_FT
1. You had a stent placed in your ureter due to a complicated kidney stone infection. You need to see Dr. Zapata from urology on April 23rd at 11:30am to discuss further treatment options to address the kidney stone. The office can be reached at [phone].     2. Please see Dr. Nabil Marks, your primary care doctor, within 1-2 weeks of discharge to discuss your recent hospitalization. You should also inquire about the need to continue your eliquis medication for pulmonary embolism. The office can be reached at [phone].

## 2021-04-09 NOTE — PROGRESS NOTE ADULT - NUTRITIONAL ASSESSMENT
This patient has been assessed with a concern for Malnutrition and has been determined to have a diagnosis/diagnoses of Mild protein-calorie malnutrition and Underweight (BMI < 19).    This patient is being managed with:   Diet NPO after Midnight-     NPO Start Date: 07-Apr-2021   NPO Start Time: 23:59  Except Medications  Entered: Apr 7 2021  6:48PM    Diet Pureed-  Supplement Feeding Modality:  Oral  Ensure Enlive Cans or Servings Per Day:  1       Frequency:  Two Times a day  Entered: Apr 7 2021  6:09PM    
This patient has been assessed with a concern for Malnutrition and has been determined to have a diagnosis/diagnoses of Mild protein-calorie malnutrition and Underweight (BMI < 19).    This patient is being managed with:   Diet Pureed-  Supplement Feeding Modality:  Oral  Ensure Enlive Cans or Servings Per Day:  1       Frequency:  Two Times a day  Entered: Apr 8 2021  5:28PM

## 2021-04-09 NOTE — PROGRESS NOTE ADULT - ASSESSMENT
Assessment: 76F PMH PE (2016 on Eliquis), CIDP, constipation, GERD, hypothyroidism, dropped foot, broken hip fracture in 2016 (not repaired), shoulder injury (repaired 2016), rotator cuff tear, neurogenic bladder c/b chronic UTIs w/ suprapubic catheter (changed 1 week before admission, most recently changed on admission), admitted with cough, fever, and increased confusion noted by HHA admitted for hypoxic respiratory failure and for severe sepsis 2/2 UTI. The patient has remained afebrile and hemodynamically stable since admission from the ER. She has had a slight leukocytosis with urine culture 4/06/21 growing MRSA, but blood cultures 4/06/21 showing no growth. The patient has flank/back pain, CT A/P 4/07/21 showing moderate left hydronephrosis with a proximal to mid ureteral 10 mm calculus s/p left ureteral stent placement 4/08/21. Left kidney urine for culture was obtained in the OR 4/08/21, but has not resulted.    Recommendations:  - Continue suprapubic tube while inpatient and on discharge  - Follow up urine cultures; treatment per primary team  - Continue care per primary team  - No urologic surgery intervention at this time  - Follow up with established outside urologist or Dr. Melgar within 2 weeks of discharge to discuss definitive stone management   Assessment: 76F PMH PE (2016 on Eliquis), CIDP, constipation, GERD, hypothyroidism, dropped foot, broken hip fracture in 2016 (not repaired), shoulder injury (repaired 2016), rotator cuff tear, neurogenic bladder c/b chronic UTIs w/ suprapubic catheter (changed 1 week before admission, most recently changed on admission), admitted with cough, fever, and increased confusion noted by HHA admitted for hypoxic respiratory failure and for severe sepsis 2/2 UTI. The patient has remained afebrile and hemodynamically stable since admission from the ER. She has had a slight leukocytosis with urine culture 4/06/21 growing MRSA, but blood cultures 4/06/21 showing no growth. The patient has flank/back pain, CT A/P 4/07/21 showing moderate left hydronephrosis with a proximal to mid ureteral 10 mm calculus s/p left ureteral stent placement 4/08/21. Left kidney urine for culture was obtained in the OR 4/08/21, but has not resulted.    Recommendations:  - Continue suprapubic tube while inpatient and on discharge  - Follow up urine cultures; treatment per primary team  - Continue care per primary team  - No urologic surgery intervention at this time  - Follow up with Dr. Zapata within 2 weeks of discharge to discuss definitive stone management

## 2021-04-09 NOTE — DISCHARGE NOTE PROVIDER - NSDCCPTREATMENT_GEN_ALL_CORE_FT
PRINCIPAL PROCEDURE  Procedure: CT kidney wo contrast  Findings and Treatment: 1.  Moderate left hydronephrosis with proximal to mid 10 mm ureteral stone. Evaluation of renal parenchyma for pyelonephritis or renal abscess is limited on noncontrast study. Additional punctate nonobstructing bilateral renal stones.  2.  Large stool burden with stercoral proctocolitis.  3.  Probable subacute to chronic left femoral neck fracture with significant displacement.

## 2021-04-09 NOTE — DISCHARGE NOTE PROVIDER - NSDCMRMEDTOKEN_GEN_ALL_CORE_FT
Benadryl 12.5 mg oral tablet, chewable: 1 tab(s) orally every 6 hours, As Needed  Claritin 5 mg oral tablet, chewable: 2 tab(s) orally once a day, As Needed for allergies  Eliquis 5 mg oral tablet: 1 tab(s) orally 2 times a day  levothyroxine 50 mcg (0.05 mg) oral tablet: 1 tab(s) orally once a day  Linzess 145 mcg oral capsule: 1 cap(s) orally once a day  mirtazapine 30 mg oral tablet: 1 tab(s) orally once a day (at bedtime)  pantoprazole 40 mg oral delayed release tablet: 1 tab(s) orally once a day  Refresh Plus ophthalmic solution: 1 drop(s) to each affected eye 4 times a day  Reglan 10 mg oral tablet: 1 tab(s) orally 4 times a day, As Needed  Valium 10 mg oral tablet: 1 tab(s) orally once a day (at bedtime), As Needed   Benadryl 12.5 mg oral tablet, chewable: 1 tab(s) orally every 6 hours, As Needed  Claritin 5 mg oral tablet, chewable: 2 tab(s) orally once a day, As Needed for allergies  Eliquis 5 mg oral tablet: 1 tab(s) orally 2 times a day  levothyroxine 50 mcg (0.05 mg) oral tablet: 1 tab(s) orally once a day  Linzess 145 mcg oral capsule: 1 cap(s) orally once a day  mirtazapine 30 mg oral tablet: 1 tab(s) orally once a day (at bedtime)  pantoprazole 40 mg oral delayed release tablet: 1 tab(s) orally once a day  polyethylene glycol 3350 oral powder for reconstitution: 17 gram(s) orally every 12 hours  Refresh Plus ophthalmic solution: 1 drop(s) to each affected eye 4 times a day  Reglan 10 mg oral tablet: 1 tab(s) orally 4 times a day, As Needed  senna oral tablet: 2 tab(s) orally once a day (at bedtime)  sulfamethoxazole-trimethoprim 800 mg-160 mg oral tablet: 1 tab(s) orally every 12 hours MDD:2  Valium 10 mg oral tablet: 1 tab(s) orally once a day (at bedtime), As Needed   Benadryl 12.5 mg oral tablet, chewable: 1 tab(s) orally every 6 hours, As Needed  Claritin 5 mg oral tablet, chewable: 2 tab(s) orally once a day, As Needed for allergies  Eliquis 5 mg oral tablet: 1 tab(s) orally 2 times a day  levothyroxine 50 mcg (0.05 mg) oral tablet: 1 tab(s) orally once a day  Linzess 145 mcg oral capsule: 1 cap(s) orally once a day  mirtazapine 30 mg oral tablet: 1 tab(s) orally once a day (at bedtime)  pantoprazole 40 mg oral delayed release tablet: 1 tab(s) orally once a day  polyethylene glycol 3350 oral powder for reconstitution: 17 gram(s) orally every 12 hours  Refresh Plus ophthalmic solution: 1 drop(s) to each affected eye 4 times a day  Reglan 10 mg oral tablet: 1 tab(s) orally 4 times a day, As Needed  senna oral tablet: 2 tab(s) orally once a day (at bedtime)  sulfamethoxazole-trimethoprim 800 mg-160 mg oral tablet: 1 tab(s) orally every 12 hours MDD:2  Ultram 50 mg oral tablet: 1 tab(s) orally every 12 hours MDD:2  Valium 10 mg oral tablet: 1 tab(s) orally once a day (at bedtime), As Needed

## 2021-04-09 NOTE — DISCHARGE NOTE PROVIDER - DETAILS OF MALNUTRITION DIAGNOSIS/DIAGNOSES
This patient has been assessed with a concern for Malnutrition and was treated during this hospitalization for the following Nutrition diagnosis/diagnoses:     -  04/07/2021: Mild protein-calorie malnutrition   -  04/07/2021: Underweight (BMI < 19)

## 2021-04-09 NOTE — DISCHARGE NOTE PROVIDER - HOSPITAL COURSE
#Discharge: do not delete    77 y/o Female with PMHx PE (2016 on Eliquis), CIDP, constipation, chronic UTIs w/ suprapubic catheter (last changed 1 week ago), neurogenic bladder, GERD, hypothyroidism, dropped foot, broken hip fracture in 2016 (not repaired), shoulder injury (repaired 2016), and rotator cuff tear w/ 24 hour healthcare d/t injuries presents for cough, fever, and increased confusion noted by HHA admitted for hypoxic respiratory failure and for severe sepsis 2/2 UTI from ureteral stone and possibly aspiration PNA.     Problem List/Main Diagnoses (system-based):   #Sepsis due to urinary tract infection.  Plan: Patient with 5 days of increasing confusion and fever. On admission, noted to have Tmax 102, , WBC 15, and RR 26 with positive UA and lactate 3.0. Patient with chronic suprapubic catheter in place, last changed last week, and hx of multiple UTIs. Likely severe sepsis 2/2 UTI.   - Suprapubic cath in place, not clogged, changed last week and again by urology on 4/7   - c/w vanc/zosyn for MRSA UTI and concern for aspiration PNA  - Ucx + for MRSA  - CT AP stone hunt: Moderate left hydronephrosis with proximal to mid 10 mm ureteral stone. Additional punctate nonobstructing bilateral renal stones.  - plan for ureteral stent by urology on 4/8  - BCx NGTD.     #Respiratory failure.  Plan: Patient with SpO2 90% on RA on admission. Increased to 94% on 3L NC. Suspicion for PE on admission given PMHx and elevated d-dimer of 4007. CT PE negative for PE. Patient is anticoagulated on Eliquis bid at home. DDx for AHRF include 2/2 to aspiration vs atypical pna vs cardiac etiology  - CT PE negative for PE with pulmonary edema and venous congestion  - covid PCR negative x2  - urine legionella ag and strep pneumo negative   - TTE: LVEF 60-65%, no pHTN  - c/w vanc/zosyn as above  - currently satting 94% on room air    #Metabolic encephalopathy  - at baseline AAOx3 presenting with AMS x 4 days. Metabolic encephalopathy likely 2/2 to UTI.  - c/w abx as above  - CTH negative  - TSH wnl.     #Constipation.    Patient reports no BM in 4 days. Taking Linzess and miralax bid at home but struggles to pass BM d/t neurogenic issues.   - Started on bowel regimen: Miralax bid, senna 2 tab qhs, lactulose 20mL, dulcolax suppository, tap water enema  - patient had BM 4/7 s/p enema.     #Hypercalcemia.    Patient presents with Ca of 11.2. No protein gap. s/p 1.5L NS  - intact PTH 71 (elevated), vitamin D-25 29.3 (low), vit D-1,25 wnl  - cCa 10.7 (normal range 8.4-10.5)  - monitor for any altered mentation, constipation, lethargy.     #PE (pulmonary thromboembolism).   Patient with hx of PE in 2016, managed with medication. On Eliquis at home.  - will need to clarify with PCP if patient still takes medication.     #Hypothyroid. Plan: Patient with hx of hypothyroid on levothyroxine at home.   - c/w levothyroxine 50mcg qd.    #Hip fracture.  Plan: Patient with hx of hip fracture in 2016 but was not good candidate for surgery. Never had treated.   - Tylenol PRN for pain.     #Anxiety.  Plan: Patient with hx of anxiety. On home valium 10mg PRN and mirtazapine 15mg qhs.   - Will hold home meds now in the setting of somnolence and confusion.   - Will consider restarting mirtazapine when patient more alert.     #Polyneuropathy.  Plan: Patient with hx of polyneuropathy. On suprapubic cath for neurogenic bladder.   - C/w suprapubic cath- changed 1/month, last changed last week and also by urology on 4/7.     New medications: Bactrim  Labs to be followed outpatient: None  Exam to be followed outpatient: PCP, Urology   #Discharge: do not delete    75 y/o Female with PMHx PE (2016 on Eliquis), CIDP, constipation, chronic UTIs w/ suprapubic catheter (last changed 1 week ago), neurogenic bladder, GERD, hypothyroidism, dropped foot, broken hip fracture in 2016 (not repaired), shoulder injury (repaired 2016), and rotator cuff tear w/ 24 hour healthcare d/t injuries presents for cough, fever, and increased confusion noted by HHA admitted for hypoxic respiratory failure and for severe sepsis 2/2 UTI from ureteral stone and possibly aspiration PNA.     Problem List/Main Diagnoses (system-based):   #Sepsis due to urinary tract infection.  Plan: Patient with 5 days of increasing confusion and fever. On admission, noted to have Tmax 102, , WBC 15, and RR 26 with positive UA and lactate 3.0. Patient with chronic suprapubic catheter in place, last changed last week, and hx of multiple UTIs. Likely severe sepsis 2/2 UTI.   - Suprapubic cath in place, not clogged, changed last week and again by urology on 4/7   - s/p ureteral stent by urology on 4/8  - switched from vancomycin to bactrim after urine culture sensitivities. Will need total 14 day abx duration .   - CT AP stone hunt: Moderate left hydronephrosis with proximal to mid 10 mm ureteral stone. Additional punctate nonobstructing bilateral renal stones.  - BCx NGTD  - Per urology, will need follow up with Dr. Zapata within 2 weeks of discharge to discuss definitive stone management    #Respiratory failure.    Patient with SpO2 90% on RA on admission. Increased to 94% on 3L NC. Suspicion for PE on admission given PMHx and elevated d-dimer of 4007. CT PE negative for PE. Patient is anticoagulated on Eliquis bid at home.   - CT PE negative for PE with pulmonary edema and venous congestion  - covid PCR negative x2  - urine legionella ag and strep pneumo negative   - TTE: LVEF 60-65%, no pHTN  - initially on zosyn as aspiration PNA was a concern, but patient without any cough or difficulty breathing while on the floors so decision made to discontinue medication  - currently satting 94% on room air    #Metabolic encephalopathy  - at baseline AAOx3 presenting with AMS x 4 days. Metabolic encephalopathy likely 2/2 to UTI.  - c/w abx as above  - CTH negative  - TSH wnl.     #Constipation.    Patient reports no BM in 4 days. Taking Linzess and miralax bid at home but struggles to pass BM d/t neurogenic issues.   - Started on bowel regimen: Miralax bid, senna 2 tab qhs, lactulose 20mL, dulcolax suppository, tap water enema  - patient had BM 4/7 s/p enema.     #Hypercalcemia.    Patient presents with Ca of 11.2. No protein gap. s/p 1.5L NS  - intact PTH 71 (elevated), vitamin D-25 29.3 (low), vit D-1,25 wnl  - cCa 10.7 (normal range 8.4-10.5)  - monitor for any altered mentation, constipation, lethargy.     #PE (pulmonary thromboembolism).   Patient with hx of PE in 2016, managed with medication. On Eliquis at home.  - will need to clarify with PCP if patient still takes medication.     #Hypothyroid. Plan: Patient with hx of hypothyroid on levothyroxine at home.   - c/w levothyroxine 50mcg qd.    #Hip fracture.  Plan: Patient with hx of hip fracture in 2016 but was not good candidate for surgery. Never had treated.   - Tylenol PRN for pain.     #Anxiety.    Patient with hx of anxiety. On home valium 10mg PRN and mirtazapine 15mg qhs.   - Will hold home meds now in the setting of somnolence and confusion.   - can restart as an outpatient    #Polyneuropathy.    Patient with hx of polyneuropathy. On suprapubic cath for neurogenic bladder.   - C/w suprapubic cath- changed 1/month, last changed last week and also by urology on 4/7.     New medications: Bactrim  Labs to be followed outpatient: None  Exam to be followed outpatient: PCP, Urology   #Discharge: do not delete    75 y/o Female with PMHx PE (2016 on Eliquis), CIDP, constipation, chronic UTIs w/ suprapubic catheter (last changed 1 week ago), neurogenic bladder, GERD, hypothyroidism, dropped foot, broken hip fracture in 2016 (not repaired), shoulder injury (repaired 2016), and rotator cuff tear w/ 24 hour healthcare d/t injuries presents for cough, fever, and increased confusion noted by HHA admitted for hypoxic respiratory failure and for severe sepsis 2/2 UTI from ureteral stone and possibly aspiration PNA.     Problem List/Main Diagnoses (system-based):   #Sepsis due to urinary tract infection.  Plan: Patient with 5 days of increasing confusion and fever. On admission, noted to have Tmax 102, , WBC 15, and RR 26 with positive UA and lactate 3.0. Patient with chronic suprapubic catheter in place, last changed last week, and hx of multiple UTIs. Likely severe sepsis 2/2 UTI.   - Suprapubic cath in place, not clogged, changed last week and again by urology on 4/7   - s/p ureteral stent by urology on 4/8  - switched from vancomycin to bactrim after urine culture sensitivities. Will need total 14 day abx duration .   - CT AP stone hunt: Moderate left hydronephrosis with proximal to mid 10 mm ureteral stone. Additional punctate nonobstructing bilateral renal stones.  - BCx NGTD  - Per urology, will need follow up with Dr. Zapata within 2 weeks of discharge to discuss definitive stone management    #Respiratory failure.    Patient with SpO2 90% on RA on admission. Increased to 94% on 3L NC. Suspicion for PE on admission given PMHx and elevated d-dimer of 4007. CT PE negative for PE. Patient is anticoagulated on Eliquis bid at home.   - CT PE negative for PE with pulmonary edema and venous congestion  - covid PCR negative x2  - urine legionella ag and strep pneumo negative   - TTE: LVEF 60-65%, no pHTN  - initially on zosyn as aspiration PNA was a concern, but patient without any cough or difficulty breathing while on the floors so decision made to discontinue medication  - currently satting 94% on room air    #Metabolic encephalopathy  - at baseline AAOx3 presenting with AMS x 4 days. Metabolic encephalopathy likely 2/2 to UTI.  - c/w abx as above  - CTH negative  - TSH wnl.     #Constipation.    Patient reports no BM in 4 days. Taking Linzess and miralax bid at home but struggles to pass BM d/t neurogenic issues.   - Started on bowel regimen: Miralax bid, senna 2 tab qhs, lactulose 20mL, dulcolax suppository, tap water enema  - patient had BM 4/7 s/p enema.     #Hypercalcemia.    Patient presents with Ca of 11.2. No protein gap. s/p 1.5L NS  - intact PTH 71 (elevated), vitamin D-25 29.3 (low), vit D-1,25 wnl  - cCa 10.7 (normal range 8.4-10.5)  - monitor for any altered mentation, constipation, lethargy.     #PE (pulmonary thromboembolism).   Patient with hx of PE in 2016, managed with medication. On Eliquis at home.  - will need to clarify with PCP if patient still takes medication.     #Hypothyroid. Plan: Patient with hx of hypothyroid on levothyroxine at home.   - c/w levothyroxine 50mcg qd.    #Hip fracture.  Plan: Patient with hx of hip fracture in 2016 but was not good candidate for surgery. Never had treated.   - Tylenol PRN for pain.     #Anxiety.    Patient with hx of anxiety. On home valium 10mg PRN and mirtazapine 15mg qhs.   - Will hold home meds now in the setting of somnolence and confusion.   - can restart as an outpatient    #Polyneuropathy.    Patient with hx of polyneuropathy. On suprapubic cath for neurogenic bladder.   - C/w suprapubic cath- changed 1/month, last changed last week and also by urology on 4/7.     New medications: Bactrim, miralax, senna  Labs to be followed outpatient: None  Exam to be followed outpatient: PCP, Urology   #Discharge: do not delete    77 y/o Female with PMHx PE (2016 on Eliquis), CIDP, constipation, chronic UTIs w/ suprapubic catheter (last changed 1 week ago), neurogenic bladder, GERD, hypothyroidism, dropped foot, broken hip fracture in 2016 (not repaired), shoulder injury (repaired 2016), and rotator cuff tear w/ 24 hour healthcare d/t injuries presents for cough, fever, and increased confusion noted by HHA admitted for hypoxic respiratory failure and for severe sepsis 2/2 UTI from ureteral stone and possibly aspiration PNA.     Problem List/Main Diagnoses (system-based):   #Sepsis due to urinary tract infection.  Plan: Patient with 5 days of increasing confusion and fever. On admission, noted to have Tmax 102, , WBC 15, and RR 26 with positive UA and lactate 3.0. Patient with chronic suprapubic catheter in place, last changed last week, and hx of multiple UTIs. Likely severe sepsis 2/2 UTI.   - Suprapubic cath in place, not clogged, changed last week and again by urology on 4/7   - s/p ureteral stent by urology on 4/8  - switched from vancomycin to bactrim after urine culture sensitivities. Will need total 14 day abx duration .   - CT AP stone hunt: Moderate left hydronephrosis with proximal to mid 10 mm ureteral stone. Additional punctate nonobstructing bilateral renal stones.  - BCx NGTD  - Per urology, will need follow up with Dr. Zapata within 2 weeks of discharge to discuss definitive stone management    #Respiratory failure.    Patient with SpO2 90% on RA on admission. Increased to 94% on 3L NC. Suspicion for PE on admission given PMHx and elevated d-dimer of 4007. CT PE negative for PE. Patient is anticoagulated on Eliquis bid at home.   - CT PE negative for PE with pulmonary edema and venous congestion  - covid PCR negative x2  - urine legionella ag and strep pneumo negative   - TTE: LVEF 60-65%, no pHTN  - initially on zosyn as aspiration PNA was a concern, but patient without any cough or difficulty breathing while on the floors so decision made to discontinue medication  - currently satting 94% on room air    #Metabolic encephalopathy  - at baseline AAOx3 presenting with AMS x 4 days. Metabolic encephalopathy likely 2/2 to UTI.  - c/w abx as above  - CTH negative  - TSH wnl.     #Constipation.    Patient reports no BM in 4 days. Taking Linzess and miralax bid at home but struggles to pass BM d/t neurogenic issues.   - Started on bowel regimen: Miralax bid, senna 2 tab qhs, lactulose 20mL, dulcolax suppository, tap water enema  - patient had BM 4/7 s/p enema.     #Hypercalcemia.    Patient presents with Ca of 11.2. No protein gap. s/p 1.5L NS  - intact PTH 71 (elevated), vitamin D-25 29.3 (low), vit D-1,25 wnl  - cCa 10.7 (normal range 8.4-10.5)  - monitor for any altered mentation, constipation, lethargy.     #PE (pulmonary thromboembolism).   Patient with hx of PE in 2016, managed with medication. On Eliquis at home.  - will need to clarify with PCP if patient still takes medication.     #Hypothyroid. Plan: Patient with hx of hypothyroid on levothyroxine at home.   - c/w levothyroxine 50mcg qd.    #Hip fracture.  Plan: Patient with hx of hip fracture in 2016 but was not good candidate for surgery. Never had treated.   - Tylenol PRN for pain.     #Anxiety.    Patient with hx of anxiety. On home valium 10mg PRN and mirtazapine 15mg qhs.   - Will hold home meds now in the setting of somnolence and confusion.   - can restart as an outpatient    #Polyneuropathy.    Patient with hx of polyneuropathy. On suprapubic cath for neurogenic bladder.   - C/w suprapubic cath- changed 1/month, last changed last week and also by urology on 4/7.     New medications: Bactrim, miralax, senna, tramadol 50 mg BID x5 days  Labs to be followed outpatient: None  Exam to be followed outpatient: PCP, Urology

## 2021-04-09 NOTE — DISCHARGE NOTE PROVIDER - PROVIDER TOKENS
PROVIDER:[TOKEN:[[id number]],SCHEDULEDAPPT:[04/23/2021],SCHEDULEDAPPTTIME:[11:30 AM]] PROVIDER:[TOKEN:[[id number]],SCHEDULEDAPPT:[04/23/2021],SCHEDULEDAPPTTIME:[11:30 AM]],FREE:[LAST:[Kendrick],FIRST:[Nabil],PHONE:[phone]:[(   [phone]    -],ADDRESS:[[address] [place] [address]],FOLLOWUP:[1 week],ESTABLISHEDPATIENT:[T]]

## 2021-04-09 NOTE — PROGRESS NOTE ADULT - ATTENDING COMMENTS
Patient seen and examined, agree with plan for intervention. This morning she is c/o significant lower back/?flank pain. On exam she is globally tender on her back. I've asked for additional pain control. Given pain, MRSA infection with obstruction, I've recommended a ureteral stent. She is in agreement with the plan. RBAPCs discussed.
Patient seen examined, agree with plan.
76F PMHx PE (2016 on Eliquis), constipation, chronic UTIs w/ suprapubic catheter (last changed 1 week ago), neurogenic bladder, GERD, hypothyroidism, dropped foot, broken hip fracture in 2016 (not repaired), shoulder injury (repaired 2016), and rotator cuff tear w/ 24 hour healthcare d/t injuries presents for cough, fever, and altered mental status per HHa and admitted for sepsis/hypoxia due to pneumonia. CT neg for PE. Pro bnp neg,unlikely fluid overload.Pt also started on rx for uti and catheter replaced. covid negx2 and  imaging  findings neg for groundglass opacities and ID consult awaited to r/o covid       Assesment  Metabolic Encephalopathy due to sepsis/UTI  Sepsis due to Pneumonia/UTI  MRSA CAUTI present on admission( catheter replaced)  constipation? due to Hypercalcemia  Hypercalcemia? due to primary hyperparathyroidism   PE (2016 on Eliquis)  recurrent UTIs w/ suprapubic catheter   neurogenic bladder  GERD   hypothyroidism   dropped foot, broken hip fracture in 2016 (not repaired)  shoulder injury (repaired 2016), and rotator cuff tear       Plan  mental status better  ct head neg  Unlikely covid due to 2 neg tests and ct neg for groundglass opacities    cont Vanco and monitor trough and renal functions  cont zosyn  consider descalating antibiotics if  no fever/sepsis resolves  f/u sepsis w/u  catheter replaced    Mag citrate and enema as clinical exam neg for acute abdomen    ivf and correct Ca and consider tele if trending up  ptsh,po4 and vit D  dvt prophylaxis  frequent change in position   Discuss goals of care  PT eval and discharge planning with HHA

## 2021-04-09 NOTE — DISCHARGE NOTE PROVIDER - CARE PROVIDER_API CALL
Saurabh Zapata)  Urology  [address] [place] [address], [place] [address]  Phone: [phone]  Fax: [phone]  Scheduled Appointment: 04/23/2021 11:30 AM   Saurabh Zapata)  Urology  [address], [address]  [place] [address]  Phone: [phone]  Fax: [phone]  Scheduled Appointment: 04/23/2021 11:30 AM    Nabil Marks  [address] [place] [address]  Phone: [phone]  Fax: (   )    -  Established Patient  Follow Up Time: 1 week

## 2021-04-09 NOTE — PROGRESS NOTE ADULT - SUBJECTIVE AND OBJECTIVE BOX
SUBJECTIVE: Denies fevers/chills       OBJECTIVE:    Vital Signs:  Vital Signs Last 24 Hrs  T(C): 36.9 (09 Apr 2021 06:05), Max: 36.9 (09 Apr 2021 06:05)  T(F): 98.5 (09 Apr 2021 06:05), Max: 98.5 (09 Apr 2021 06:05)  HR: 74 (09 Apr 2021 06:05) (71 - 87)  BP: 122/74 (09 Apr 2021 06:05) (113/71 - 128/70)  BP(mean): 82 (08 Apr 2021 15:53) (82 - 92)  RR: 18 (09 Apr 2021 06:05) (18 - 25)  SpO2: 93% (09 Apr 2021 06:05) (92% - 95%)    Physical Exam:  General: NAD  Pulmonary: Nonlabored breathing, no respiratory distress  Abdominal: No suprapubic tenderness  : Suprapubic tube draining clear yellow urine; No pinpoint CVA tenderness bilaterally     Ins and Outs:  I&O's Summary    08 Apr 2021 07:01  -  09 Apr 2021 07:00  --------------------------------------------------------  IN: 220 mL / OUT: 260 mL / NET: -40 mL        Labs:                        11.7   9.10  )-----------( 207      ( 08 Apr 2021 08:41 )             35.8     04-08    138  |  104  |  13  ----------------------------<  121<H>  3.3<L>   |  24  |  0.41<L>    Ca    9.5      08 Apr 2021 15:37  Phos  2.2     04-08  Mg     1.7     04-08    TPro  6.1  /  Alb  3.0<L>  /  TBili  0.9  /  DBili  x   /  AST  11  /  ALT  9<L>  /  AlkPhos  43  04-08    PT/INR - ( 08 Apr 2021 08:41 )   PT: 17.7 sec;   INR: 1.50          PTT - ( 08 Apr 2021 08:41 )  PTT:37.0 sec    CAPILLARY BLOOD GLUCOSE        LIVER FUNCTIONS - ( 08 Apr 2021 08:41 )  Alb: 3.0 g/dL / Pro: 6.1 g/dL / ALK PHOS: 43 U/L / ALT: 9 U/L / AST: 11 U/L / GGT: x

## 2021-04-09 NOTE — DISCHARGE NOTE PROVIDER - NSDCCPCAREPLAN_GEN_ALL_CORE_FT
PRINCIPAL DISCHARGE DIAGNOSIS  Diagnosis: Urinary tract infection  Assessment and Plan of Treatment: You came in to the hospital after your health aid found  you Urinary tract infections, also called "UTIs," are infections that affect either the bladder or the kidneys. Bladder infections are more common than kidney infections. Bladder infections happen when bacteria get into the urethra and travel up into the bladder. Kidney infections happen when the bacteria travel even higher, up into the kidneys. The symptoms of a bladder infection include pain or a burning feeling when you urinate, the need to urinate often, the need to urinate suddenly or in a hurry, blood in the urine. Signs that an infection has spread to the kidneys include fever, back pain, or nausea/vomiting. It is important that you take your antibiotics as prescribed and to completion to properly treat your urinary tract infection and prevent antibiotic resistance.        SECONDARY DISCHARGE DIAGNOSES  Diagnosis: Pulmonary embolism  Assessment and Plan of Treatment:      PRINCIPAL DISCHARGE DIAGNOSIS  Diagnosis: Urinary tract infection  Assessment and Plan of Treatment: You came in to the hospital after your health aid found you very confused with fevers for 5 days. We checked your urine and found a urinary tract infection that is resistant to most antibiotics (called MRSA). We also found a stone near your left kidney and the urology team placed an adjacent stent to prevent damage to your organ. We think your infection is likely from your suprapublic catheter, which was replaced by the urology team. UTIs are infections that affect either the bladder or the kidneys. Bladder infections are more common than kidney infections. Bladder infections happen when bacteria get into the urethra and travel up into the bladder. Kidney infections happen when the bacteria travel even higher, up into the kidneys. The symptoms of a bladder infection include pain or a burning feeling when you urinate, the need to urinate often, the need to urinate suddenly or in a hurry, blood in the urine. Signs that an infection has spread to the kidneys include fever, back pain, or nausea/vomiting. It is important that you take your antibiotics as prescribed and to completion to properly treat your urinary tract infection and prevent antibiotic resistance.  Instructions:  1. Please continue taking bactrim every 12 hours for 12 more days (April 10 - April 21).   2. If you are experiencing worsening symptoms such as fevers, chills, worsening flank pain you may need to return to the emergency department for further management of your infection.   3. Please follow-up with the office of Dr. Zapata on April 23rd at 11:30am to discuss management of your kidney stone.         SECONDARY DISCHARGE DIAGNOSES  Diagnosis: Pulmonary embolism  Assessment and Plan of Treatment: You have a history of pulmonary embolism in your lungs during the year 2016 for which you take eliquis 5 mg every 12 hours. On day of admission we were informed you still take this medication, but recommend following up with your primary care doctor and confirm if this medication is still needed.     PRINCIPAL DISCHARGE DIAGNOSIS  Diagnosis: Urinary tract infection  Assessment and Plan of Treatment: You came in to the hospital after your health aid found you very confused with fevers for 5 days. We checked your urine and found a urinary tract infection that is resistant to most antibiotics (called MRSA). We also found a stone near your left kidney and the urology team placed an adjacent stent to prevent damage to your organ. We think your infection is likely from your suprapublic catheter, which was replaced by the urology team. UTIs are infections that affect either the bladder or the kidneys. Bladder infections are more common than kidney infections. Bladder infections happen when bacteria get into the urethra and travel up into the bladder. Kidney infections happen when the bacteria travel even higher, up into the kidneys. The symptoms of a bladder infection include pain or a burning feeling when you urinate, the need to urinate often, the need to urinate suddenly or in a hurry, blood in the urine. Signs that an infection has spread to the kidneys include fever, back pain, or nausea/vomiting. It is important that you take your antibiotics as prescribed and to completion to properly treat your urinary tract infection and prevent antibiotic resistance.  Instructions:  1. Please continue taking bactrim every 12 hours for 12 more days (April 10 - April 21).   2. If you are experiencing worsening symptoms such as fevers, chills, worsening flank pain you may need to return to the emergency department for further management of your infection.   3. Please follow-up with the office of Dr. Zapata on April 23rd at 11:30am to discuss management of your kidney stone.         SECONDARY DISCHARGE DIAGNOSES  Diagnosis: Pulmonary embolism  Assessment and Plan of Treatment: You have a history of pulmonary embolism in your lungs during the year 2016 for which you take eliquis 5 mg every 12 hours. On day of admission we were informed you still take this medication, but recommend following up with your primary care doctor and confirm if this medication is still needed.    Diagnosis: Constipation  Assessment and Plan of Treatment: You report a history of chronic constipation with infrequent bowel movements every 3-4 days. We encourage you to drink plenty of fluids along with miralax, senna, and suppositories if needed. This is especially important in patients who are on chronic pain medications.     PRINCIPAL DISCHARGE DIAGNOSIS  Diagnosis: Urinary tract infection  Assessment and Plan of Treatment: You came in to the hospital after your health aid found you very confused with fevers for 5 days. We checked your urine and found a urinary tract infection that is resistant to most antibiotics (called MRSA). We also found a stone near your left kidney and the urology team placed an adjacent stent to prevent damage to your organ. We think your infection is likely from your suprapublic catheter, which was replaced by the urology team. UTIs are infections that affect either the bladder or the kidneys. Bladder infections are more common than kidney infections. Bladder infections happen when bacteria get into the urethra and travel up into the bladder. Kidney infections happen when the bacteria travel even higher, up into the kidneys. The symptoms of a bladder infection include pain or a burning feeling when you urinate, the need to urinate often, the need to urinate suddenly or in a hurry, blood in the urine. Signs that an infection has spread to the kidneys include fever, back pain, or nausea/vomiting. It is important that you take your antibiotics as prescribed and to completion to properly treat your urinary tract infection and prevent antibiotic resistance.  Instructions:  1. Please continue taking bactrim every 12 hours for 12 more days (April 10 - April 21).   2. You experienced worsening back pain while in the hospital, which we think is from your urinary tract infection. We are prescribing you a 5 day course of tramadol 50 milligrams. Please only take one pill every 12 hours as needed. You can also take tylenol 650 mg in spaced 6 hours apart.   3. If you are experiencing worsening symptoms such as fevers, chills, worsening flank pain you may need to return to the emergency department for further management of your infection.   4. Please follow-up with the office of Dr. Zapata on April 23rd at 11:30am to discuss management of your kidney stone.         SECONDARY DISCHARGE DIAGNOSES  Diagnosis: Pulmonary embolism  Assessment and Plan of Treatment: You have a history of pulmonary embolism in your lungs during the year 2016 for which you take eliquis 5 mg every 12 hours. On day of admission we were informed you still take this medication, but recommend following up with your primary care doctor and confirm if this medication is still needed.    Diagnosis: Constipation  Assessment and Plan of Treatment: You report a history of chronic constipation with infrequent bowel movements every 3-4 days. We encourage you to drink plenty of fluids along with miralax, senna, and suppositories if needed. This is especially important while you are taking tramadol for back pain because a common side effect of opioids is constipation.     PRINCIPAL DISCHARGE DIAGNOSIS  Diagnosis: Urinary tract infection  Assessment and Plan of Treatment: You came in to the hospital after your health aid found you very confused with fevers for 5 days. We checked your urine and found a urinary tract infection that is resistant to most antibiotics (called MRSA). We also found a stone near your left kidney and the urology team placed an adjacent stent to prevent damage to your organ. We think your infection is likely from your suprapublic catheter, which was replaced by the urology team. UTIs are infections that affect either the bladder or the kidneys. Bladder infections are more common than kidney infections. Bladder infections happen when bacteria get into the urethra and travel up into the bladder. Kidney infections happen when the bacteria travel even higher, up into the kidneys. The symptoms of a bladder infection include pain or a burning feeling when you urinate, the need to urinate often, the need to urinate suddenly or in a hurry, blood in the urine. Signs that an infection has spread to the kidneys include fever, back pain, or nausea/vomiting. It is important that you take your antibiotics as prescribed and to completion to properly treat your urinary tract infection and prevent antibiotic resistance.  Instructions:  1. Please continue taking bactrim every 12 hours for 12 more days (April 10 - April 21).   2. You experienced worsening back pain while in the hospital, which we think is from your urinary tract infection. We are prescribing you a 5 day course of tramadol 50 milligrams. Please only take one pill every 12 hours as needed. You can also take tylenol 650 mg in spaced 6 hours apart. If your pain is unbearable please call your primary care doctor or go to the nearest emergency department.  3. If you are experiencing worsening symptoms such as fevers, chills, worsening flank pain we also recommend returning to the emergency department for further management of your infection.   4. Please follow-up with the office of Dr. Zapata on April 23rd at 11:30am to discuss management of your kidney stone.         SECONDARY DISCHARGE DIAGNOSES  Diagnosis: Pulmonary embolism  Assessment and Plan of Treatment: You have a history of pulmonary embolism in your lungs during the year 2016 for which you take eliquis 5 mg every 12 hours. On day of admission we were informed you still take this medication, but recommend following up with your primary care doctor and confirm if this medication is still needed.    Diagnosis: Constipation  Assessment and Plan of Treatment: You report a history of chronic constipation with infrequent bowel movements every 3-4 days. We encourage you to drink plenty of fluids along with miralax, senna, and suppositories if needed. This is especially important while you are taking tramadol for back pain because a common side effect of opioids is constipation.

## 2021-04-09 NOTE — DISCHARGE NOTE NURSING/CASE MANAGEMENT/SOCIAL WORK - PATIENT PORTAL LINK FT
You can access the FollowMyHealth Patient Portal offered by [facility] by registering at the following website: [email or web address]. By joining [facility]’s FollowMyHealth portal, you will also be able to view your health information using other applications (apps) compatible with our system.

## 2021-04-09 NOTE — DISCHARGE NOTE NURSING/CASE MANAGEMENT/SOCIAL WORK - NSDCPEELIQUISREACT_GEN_ALL_CORE
Apixaban/Eliquis increases your risk for bleeding. Notify your doctor if you experience any of the following side effects: bleeding, coughing or vomiting blood, red or black stool, unexpected pain or swelling, itching or hives, chest pain, chest tightness, trouble breathing, changes in how much or how often you urinate, red or pink urine, numbness or tingling in your feet, or unusual muscle weakness. When Apixaban/Eliquis is taken with other medicines, they can affect how it works. Taking other medications such as aspirin, blood thinners, nonsteroidal anti-inflammatories, and medications that treat depression can increase your risk of bleeding. It is very important to tell your health care provider about all of the other medicines, including over-the-counter medications, herbs, and vitamins you are taking. DO NOT start, stop, or change the dosage of any medicine, including over-the-counter medicines, vitamins, and herbal products without your doctor’s approval. Any products containing aspirin or are nonsteroidal anti-inflammatories lessen the blood’s ability to form clots and add to the effect of Apixaban/Eliquis. Never take aspirin or medicines that contain aspirin without speaking to your doctor.
gradual onset/constant/worsening

## 2021-04-10 LAB
-  CEFAZOLIN: SIGNIFICANT CHANGE UP
-  DAPTOMYCIN: SIGNIFICANT CHANGE UP
-  LINEZOLID: SIGNIFICANT CHANGE UP
-  OXACILLIN: SIGNIFICANT CHANGE UP
-  RIFAMPIN: SIGNIFICANT CHANGE UP
-  TETRACYCLINE: SIGNIFICANT CHANGE UP
-  TRIMETHOPRIM/SULFAMETHOXAZOLE: SIGNIFICANT CHANGE UP
METHOD TYPE: SIGNIFICANT CHANGE UP

## 2021-04-11 LAB
-  VANCOMYCIN: SIGNIFICANT CHANGE UP
CULTURE RESULTS: SIGNIFICANT CHANGE UP
METHOD TYPE: SIGNIFICANT CHANGE UP
ORGANISM # SPEC MICROSCOPIC CNT: SIGNIFICANT CHANGE UP
SPECIMEN SOURCE: SIGNIFICANT CHANGE UP

## 2021-04-12 PROBLEM — Z00.00 ENCOUNTER FOR PREVENTIVE HEALTH EXAMINATION: Status: ACTIVE | Noted: 2021-04-12

## 2021-04-15 DIAGNOSIS — G93.41 METABOLIC ENCEPHALOPATHY: ICD-10-CM

## 2021-04-15 DIAGNOSIS — K21.9 GASTRO-ESOPHAGEAL REFLUX DISEASE WITHOUT ESOPHAGITIS: ICD-10-CM

## 2021-04-15 DIAGNOSIS — B95.62 METHICILLIN RESISTANT STAPHYLOCOCCUS AUREUS INFECTION AS THE CAUSE OF DISEASES CLASSIFIED ELSEWHERE: ICD-10-CM

## 2021-04-15 DIAGNOSIS — J96.01 ACUTE RESPIRATORY FAILURE WITH HYPOXIA: ICD-10-CM

## 2021-04-15 DIAGNOSIS — N31.9 NEUROMUSCULAR DYSFUNCTION OF BLADDER, UNSPECIFIED: ICD-10-CM

## 2021-04-15 DIAGNOSIS — Z79.01 LONG TERM (CURRENT) USE OF ANTICOAGULANTS: ICD-10-CM

## 2021-04-15 DIAGNOSIS — E44.1 MILD PROTEIN-CALORIE MALNUTRITION: ICD-10-CM

## 2021-04-15 DIAGNOSIS — G61.81 CHRONIC INFLAMMATORY DEMYELINATING POLYNEURITIS: ICD-10-CM

## 2021-04-15 DIAGNOSIS — R65.20 SEVERE SEPSIS WITHOUT SEPTIC SHOCK: ICD-10-CM

## 2021-04-15 DIAGNOSIS — Z74.01 BED CONFINEMENT STATUS: ICD-10-CM

## 2021-04-15 DIAGNOSIS — A41.9 SEPSIS, UNSPECIFIED ORGANISM: ICD-10-CM

## 2021-04-15 DIAGNOSIS — R50.9 FEVER, UNSPECIFIED: ICD-10-CM
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
## 2021-04-15 DIAGNOSIS — F41.9 ANXIETY DISORDER, UNSPECIFIED: ICD-10-CM

## 2021-04-15 DIAGNOSIS — Z86.711 PERSONAL HISTORY OF PULMONARY EMBOLISM: ICD-10-CM

## 2021-04-15 DIAGNOSIS — T83.511A INFECTION AND INFLAMMATORY REACTION DUE TO INDWELLING URETHRAL CATHETER, INITIAL ENCOUNTER: ICD-10-CM

## 2021-04-15 DIAGNOSIS — E03.9 HYPOTHYROIDISM, UNSPECIFIED: ICD-10-CM

## 2021-04-15 DIAGNOSIS — N13.6 PYONEPHROSIS: ICD-10-CM

## 2021-04-15 DIAGNOSIS — M21.379 FOOT DROP, UNSPECIFIED FOOT: ICD-10-CM
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
## 2021-04-15 DIAGNOSIS — L89.152 PRESSURE ULCER OF SACRAL REGION, STAGE 2: ICD-10-CM
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
## 2021-04-15 DIAGNOSIS — K59.00 CONSTIPATION, UNSPECIFIED: ICD-10-CM

## 2021-04-15 DIAGNOSIS — Z88.8 ALLERGY STATUS TO OTHER DRUGS, MEDICAMENTS AND BIOLOGICAL SUBSTANCES STATUS: ICD-10-CM

## 2021-04-15 DIAGNOSIS — S72.009D FRACTURE OF UNSPECIFIED PART OF NECK OF UNSPECIFIED FEMUR, SUBSEQUENT ENCOUNTER FOR CLOSED FRACTURE WITH ROUTINE HEALING: ICD-10-CM

## 2021-04-15 DIAGNOSIS — E21.0 PRIMARY HYPERPARATHYROIDISM: ICD-10-CM

## 2021-04-15 DIAGNOSIS — Z91.041 RADIOGRAPHIC DYE ALLERGY STATUS: ICD-10-CM

## 2021-04-15 DIAGNOSIS — Z88.1 ALLERGY STATUS TO OTHER ANTIBIOTIC AGENTS STATUS: ICD-10-CM

## 2021-04-15 DIAGNOSIS — J84.89 OTHER SPECIFIED INTERSTITIAL PULMONARY DISEASES: ICD-10-CM

## 2021-04-17 LAB — PTH RELATED PROT SERPL-MCNC: <2 PMOL/L — SIGNIFICANT CHANGE UP

## 2021-04-23 ENCOUNTER — APPOINTMENT (OUTPATIENT)
Dept: UROLOGY | Facility: CLINIC | Age: 76
End: 2021-04-23
Payer: MEDICARE

## 2021-04-23 VITALS — DIASTOLIC BLOOD PRESSURE: 68 MMHG | TEMPERATURE: 98.2 F | HEART RATE: 86 BPM | SYSTOLIC BLOOD PRESSURE: 103 MMHG

## 2021-04-23 DIAGNOSIS — Z86.39 PERSONAL HISTORY OF OTHER ENDOCRINE, NUTRITIONAL AND METABOLIC DISEASE: ICD-10-CM

## 2021-04-23 DIAGNOSIS — Z87.19 PERSONAL HISTORY OF OTHER DISEASES OF THE DIGESTIVE SYSTEM: ICD-10-CM

## 2021-04-23 DIAGNOSIS — Z86.711 PERSONAL HISTORY OF PULMONARY EMBOLISM: ICD-10-CM

## 2021-04-23 DIAGNOSIS — N20.1 CALCULUS OF URETER: ICD-10-CM

## 2021-04-23 DIAGNOSIS — Z87.81 PERSONAL HISTORY OF (HEALED) TRAUMATIC FRACTURE: ICD-10-CM

## 2021-04-23 DIAGNOSIS — Z86.69 PERSONAL HISTORY OF OTHER DISEASES OF THE NERVOUS SYSTEM AND SENSE ORGANS: ICD-10-CM

## 2021-04-23 DIAGNOSIS — N31.9 NEUROMUSCULAR DYSFUNCTION OF BLADDER, UNSPECIFIED: ICD-10-CM

## 2021-04-23 DIAGNOSIS — Z78.9 OTHER SPECIFIED HEALTH STATUS: ICD-10-CM

## 2021-04-23 PROCEDURE — 99214 OFFICE O/P EST MOD 30 MIN: CPT

## 2021-04-23 RX ORDER — LINACLOTIDE 72 UG/1
CAPSULE, GELATIN COATED ORAL
Refills: 0 | Status: ACTIVE | COMMUNITY

## 2021-04-23 RX ORDER — APIXABAN 5 MG/1
5 TABLET, FILM COATED ORAL
Refills: 0 | Status: ACTIVE | COMMUNITY

## 2021-04-23 RX ORDER — LORATADINE 10 MG
17 TABLET,DISINTEGRATING ORAL
Refills: 0 | Status: ACTIVE | COMMUNITY

## 2021-04-23 NOTE — HISTORY OF PRESENT ILLNESS
[FreeTextEntry1] : 75 yo female with multiple medical problems presents to the office today for out-patient follow up after she had a left ureteral stent place in early April for a febrile UTI complicated by a 1 cm obstructing left mid ureteral stone.  The CT scan also showed bladder stones and small non-obstructing right sided stones. Her Ucx was positive for MRSA and was treated with 2 weeks of bactrim.  She has a hx of of neurogenic bladder and has had a chronic SP catheter.  She also has a hx of recurrent UTIs.  She is currently bed bound and was brought in on a stretcher via ambulette.  She is also current on 2L of home oxygen which she was not requiring prior to this most recent hospitalization.  \par \par Of note, she has a hx of PE in 2016 for which she is on life long anticoagulation with Eliquis.

## 2021-04-23 NOTE — ASSESSMENT
[FreeTextEntry1] : 75 yo female with 1 cm left ureteral stone s/p emergent stenting 2 weeks ago at [facility].  She would need to be medically optimized before proceeding with definitive management of her stone.  She will need to see her PCP, Dr. Nabil Marks, for optimization.  I spoke to the nurse practitioner in his office who will arrange to have her seen.  The options for treatment were discussed wit her and the most reasonable option, given her stone burden and location, is ureteroscopy with laser lithotripsy.  \par \par She has VNS arranged to change her SP tube monthly.  She is due for another change the first week of May.

## 2021-04-23 NOTE — PHYSICAL EXAM
[Heart Rate And Rhythm] : Heart rate and rhythm were normal [] : no respiratory distress [Abdomen Soft] : soft [FreeTextEntry1] : SPT in place draining lightly hematuric urine [Normal Station and Gait] : the gait and station were normal for the patient's age [Skin Color & Pigmentation] : normal skin color and pigmentation [No Focal Deficits] : no focal deficits [Oriented To Time, Place, And Person] : oriented to person, place, and time

## 2024-03-18 NOTE — PRE-OP CHECKLIST - PATIENT'S PERSONAL PROPERTY GIVEN TO
Sidra KARIMI      Kristopher Campbell MD  [facility] [place] ([facility] [place] ) [address]  [place] [address]  [phone]             Routing refill request to provider for review/approval because:        
on unit